# Patient Record
Sex: FEMALE | Race: WHITE | Employment: OTHER | URBAN - METROPOLITAN AREA
[De-identification: names, ages, dates, MRNs, and addresses within clinical notes are randomized per-mention and may not be internally consistent; named-entity substitution may affect disease eponyms.]

---

## 2017-03-30 RX ORDER — AMLODIPINE BESYLATE 10 MG/1
10 TABLET ORAL DAILY
Qty: 90 TABLET | Refills: 0 | Status: SHIPPED | OUTPATIENT
Start: 2017-03-30 | End: 2017-04-10 | Stop reason: SDUPTHER

## 2017-03-30 RX ORDER — LISINOPRIL 20 MG/1
20 TABLET ORAL 2 TIMES DAILY
Qty: 180 TABLET | Refills: 0 | Status: SHIPPED | OUTPATIENT
Start: 2017-03-30 | End: 2017-04-10 | Stop reason: SDUPTHER

## 2017-03-30 RX ORDER — HYDROCHLOROTHIAZIDE 12.5 MG/1
12.5 CAPSULE, GELATIN COATED ORAL EVERY MORNING
Qty: 90 CAPSULE | Refills: 0 | Status: SHIPPED | OUTPATIENT
Start: 2017-03-30 | End: 2017-04-10 | Stop reason: SDUPTHER

## 2017-03-31 RX ORDER — HYDROCHLOROTHIAZIDE 12.5 MG/1
12.5 CAPSULE, GELATIN COATED ORAL EVERY MORNING
Qty: 90 CAPSULE | Refills: 0 | OUTPATIENT
Start: 2017-03-31

## 2017-03-31 RX ORDER — LISINOPRIL 20 MG/1
20 TABLET ORAL 2 TIMES DAILY
Qty: 180 TABLET | Refills: 0 | OUTPATIENT
Start: 2017-03-31

## 2017-03-31 RX ORDER — AMLODIPINE BESYLATE 10 MG/1
10 TABLET ORAL DAILY
Qty: 90 TABLET | Refills: 0 | OUTPATIENT
Start: 2017-03-31

## 2017-04-10 ENCOUNTER — OFFICE VISIT (OUTPATIENT)
Dept: FAMILY MEDICINE CLINIC | Age: 82
End: 2017-04-10

## 2017-04-10 VITALS
DIASTOLIC BLOOD PRESSURE: 74 MMHG | OXYGEN SATURATION: 96 % | HEART RATE: 62 BPM | SYSTOLIC BLOOD PRESSURE: 124 MMHG | BODY MASS INDEX: 26.52 KG/M2 | WEIGHT: 145 LBS

## 2017-04-10 DIAGNOSIS — H61.20 CERUMEN DEBRIS ON TYMPANIC MEMBRANE, UNSPECIFIED LATERALITY: ICD-10-CM

## 2017-04-10 DIAGNOSIS — I10 ESSENTIAL HYPERTENSION: ICD-10-CM

## 2017-04-10 DIAGNOSIS — M25.519 SHOULDER PAIN, UNSPECIFIED CHRONICITY, UNSPECIFIED LATERALITY: ICD-10-CM

## 2017-04-10 DIAGNOSIS — N95.9 MENOPAUSAL AND PERIMENOPAUSAL DISORDER: ICD-10-CM

## 2017-04-10 DIAGNOSIS — Z23 ENCOUNTER FOR IMMUNIZATION: ICD-10-CM

## 2017-04-10 DIAGNOSIS — Z00.00 ROUTINE GENERAL MEDICAL EXAMINATION AT A HEALTH CARE FACILITY: ICD-10-CM

## 2017-04-10 DIAGNOSIS — Z00.00 HEALTHCARE MAINTENANCE: Primary | ICD-10-CM

## 2017-04-10 DIAGNOSIS — F32.A DEPRESSION, UNSPECIFIED DEPRESSION TYPE: ICD-10-CM

## 2017-04-10 PROCEDURE — G0439 PPPS, SUBSEQ VISIT: HCPCS | Performed by: FAMILY MEDICINE

## 2017-04-10 PROCEDURE — G0009 ADMIN PNEUMOCOCCAL VACCINE: HCPCS | Performed by: FAMILY MEDICINE

## 2017-04-10 PROCEDURE — 90732 PPSV23 VACC 2 YRS+ SUBQ/IM: CPT | Performed by: FAMILY MEDICINE

## 2017-04-10 RX ORDER — CITALOPRAM 20 MG/1
20 TABLET ORAL DAILY
Qty: 90 TABLET | Refills: 3 | Status: SHIPPED | OUTPATIENT
Start: 2017-04-10 | End: 2018-06-04 | Stop reason: SDUPTHER

## 2017-04-10 RX ORDER — AMLODIPINE BESYLATE 10 MG/1
10 TABLET ORAL DAILY
Qty: 90 TABLET | Refills: 3 | Status: SHIPPED | OUTPATIENT
Start: 2017-04-10 | End: 2018-06-04 | Stop reason: SDUPTHER

## 2017-04-10 RX ORDER — LISINOPRIL 20 MG/1
20 TABLET ORAL 2 TIMES DAILY
Qty: 180 TABLET | Refills: 3 | Status: SHIPPED | OUTPATIENT
Start: 2017-04-10 | End: 2018-06-04 | Stop reason: SDUPTHER

## 2017-04-10 RX ORDER — HYDROCHLOROTHIAZIDE 12.5 MG/1
12.5 CAPSULE, GELATIN COATED ORAL EVERY MORNING
Qty: 90 CAPSULE | Refills: 3 | Status: SHIPPED | OUTPATIENT
Start: 2017-04-10 | End: 2018-06-04 | Stop reason: SDUPTHER

## 2017-04-10 ASSESSMENT — LIFESTYLE VARIABLES: HOW OFTEN DO YOU HAVE A DRINK CONTAINING ALCOHOL: 0

## 2017-04-10 ASSESSMENT — PATIENT HEALTH QUESTIONNAIRE - PHQ9: SUM OF ALL RESPONSES TO PHQ QUESTIONS 1-9: 0

## 2017-04-10 ASSESSMENT — ANXIETY QUESTIONNAIRES: GAD7 TOTAL SCORE: 0

## 2017-04-11 DIAGNOSIS — E87.5 HYPERKALEMIA: Primary | ICD-10-CM

## 2017-04-11 LAB
ANION GAP SERPL CALCULATED.3IONS-SCNC: 13 MMOL/L (ref 3–16)
BUN BLDV-MCNC: 21 MG/DL (ref 7–20)
CALCIUM SERPL-MCNC: 10.2 MG/DL (ref 8.3–10.6)
CHLORIDE BLD-SCNC: 103 MMOL/L (ref 99–110)
CO2: 27 MMOL/L (ref 21–32)
CREAT SERPL-MCNC: 0.8 MG/DL (ref 0.6–1.2)
GFR AFRICAN AMERICAN: >60
GFR NON-AFRICAN AMERICAN: >60
GLUCOSE BLD-MCNC: 101 MG/DL (ref 70–99)
POTASSIUM SERPL-SCNC: 5.5 MMOL/L (ref 3.5–5.1)
SODIUM BLD-SCNC: 143 MMOL/L (ref 136–145)

## 2018-06-04 ENCOUNTER — OFFICE VISIT (OUTPATIENT)
Dept: FAMILY MEDICINE CLINIC | Age: 83
End: 2018-06-04

## 2018-06-04 VITALS — WEIGHT: 150.4 LBS | BODY MASS INDEX: 27.68 KG/M2 | HEIGHT: 62 IN

## 2018-06-04 DIAGNOSIS — I10 ESSENTIAL HYPERTENSION: ICD-10-CM

## 2018-06-04 DIAGNOSIS — F32.A DEPRESSION, UNSPECIFIED DEPRESSION TYPE: ICD-10-CM

## 2018-06-04 DIAGNOSIS — E55.9 VITAMIN D DEFICIENCY: ICD-10-CM

## 2018-06-04 DIAGNOSIS — Z00.00 ROUTINE GENERAL MEDICAL EXAMINATION AT A HEALTH CARE FACILITY: Primary | ICD-10-CM

## 2018-06-04 PROCEDURE — G0439 PPPS, SUBSEQ VISIT: HCPCS | Performed by: NURSE PRACTITIONER

## 2018-06-04 RX ORDER — LISINOPRIL 20 MG/1
20 TABLET ORAL 2 TIMES DAILY
Qty: 180 TABLET | Refills: 3 | Status: SHIPPED | OUTPATIENT
Start: 2018-06-04 | End: 2019-07-01 | Stop reason: SDUPTHER

## 2018-06-04 RX ORDER — HYDROCHLOROTHIAZIDE 12.5 MG/1
12.5 CAPSULE, GELATIN COATED ORAL EVERY MORNING
Qty: 90 CAPSULE | Refills: 3 | Status: SHIPPED | OUTPATIENT
Start: 2018-06-04 | End: 2019-07-15 | Stop reason: SDUPTHER

## 2018-06-04 RX ORDER — AMLODIPINE BESYLATE 10 MG/1
10 TABLET ORAL DAILY
Qty: 90 TABLET | Refills: 3 | Status: SHIPPED | OUTPATIENT
Start: 2018-06-04 | End: 2019-07-15 | Stop reason: SDUPTHER

## 2018-06-04 RX ORDER — CITALOPRAM 20 MG/1
20 TABLET ORAL DAILY
Qty: 90 TABLET | Refills: 3 | Status: SHIPPED | OUTPATIENT
Start: 2018-06-04 | End: 2019-05-27 | Stop reason: SDUPTHER

## 2018-06-04 ASSESSMENT — LIFESTYLE VARIABLES
HOW OFTEN DURING THE LAST YEAR HAVE YOU BEEN UNABLE TO REMEMBER WHAT HAPPENED THE NIGHT BEFORE BECAUSE YOU HAD BEEN DRINKING: 0
HOW OFTEN DO YOU HAVE A DRINK CONTAINING ALCOHOL: 0
HOW OFTEN DO YOU HAVE SIX OR MORE DRINKS ON ONE OCCASION: 0
HOW OFTEN DURING THE LAST YEAR HAVE YOU HAD A FEELING OF GUILT OR REMORSE AFTER DRINKING: 0
HAVE YOU OR SOMEONE ELSE BEEN INJURED AS A RESULT OF YOUR DRINKING: 0
HOW OFTEN DURING THE LAST YEAR HAVE YOU NEEDED AN ALCOHOLIC DRINK FIRST THING IN THE MORNING TO GET YOURSELF GOING AFTER A NIGHT OF HEAVY DRINKING: 0
HAS A RELATIVE, FRIEND, DOCTOR, OR ANOTHER HEALTH PROFESSIONAL EXPRESSED CONCERN ABOUT YOUR DRINKING OR SUGGESTED YOU CUT DOWN: 0
HOW OFTEN DURING THE LAST YEAR HAVE YOU FOUND THAT YOU WERE NOT ABLE TO STOP DRINKING ONCE YOU HAD STARTED: 0
HOW OFTEN DURING THE LAST YEAR HAVE YOU FAILED TO DO WHAT WAS NORMALLY EXPECTED FROM YOU BECAUSE OF DRINKING: 0

## 2018-06-04 ASSESSMENT — PATIENT HEALTH QUESTIONNAIRE - PHQ9: SUM OF ALL RESPONSES TO PHQ QUESTIONS 1-9: 0

## 2018-06-04 ASSESSMENT — ANXIETY QUESTIONNAIRES: GAD7 TOTAL SCORE: 0

## 2018-06-05 DIAGNOSIS — E55.9 VITAMIN D DEFICIENCY: ICD-10-CM

## 2018-06-05 DIAGNOSIS — I10 ESSENTIAL HYPERTENSION: ICD-10-CM

## 2018-06-06 LAB
A/G RATIO: 1.9 (ref 1.1–2.2)
ALBUMIN SERPL-MCNC: 4.1 G/DL (ref 3.4–5)
ALP BLD-CCNC: 47 U/L (ref 40–129)
ALT SERPL-CCNC: 9 U/L (ref 10–40)
ANION GAP SERPL CALCULATED.3IONS-SCNC: 11 MMOL/L (ref 3–16)
AST SERPL-CCNC: 14 U/L (ref 15–37)
BILIRUB SERPL-MCNC: 0.4 MG/DL (ref 0–1)
BUN BLDV-MCNC: 17 MG/DL (ref 7–20)
CALCIUM SERPL-MCNC: 10.1 MG/DL (ref 8.3–10.6)
CHLORIDE BLD-SCNC: 101 MMOL/L (ref 99–110)
CHOLESTEROL, TOTAL: 183 MG/DL (ref 0–199)
CO2: 29 MMOL/L (ref 21–32)
CREAT SERPL-MCNC: 0.8 MG/DL (ref 0.6–1.2)
GFR AFRICAN AMERICAN: >60
GFR NON-AFRICAN AMERICAN: >60
GLOBULIN: 2.2 G/DL
GLUCOSE BLD-MCNC: 93 MG/DL (ref 70–99)
HDLC SERPL-MCNC: 48 MG/DL (ref 40–60)
LDL CHOLESTEROL CALCULATED: 118 MG/DL
POTASSIUM SERPL-SCNC: 4.7 MMOL/L (ref 3.5–5.1)
SODIUM BLD-SCNC: 141 MMOL/L (ref 136–145)
TOTAL PROTEIN: 6.3 G/DL (ref 6.4–8.2)
TRIGL SERPL-MCNC: 85 MG/DL (ref 0–150)
VITAMIN D 25-HYDROXY: 62.6 NG/ML
VLDLC SERPL CALC-MCNC: 17 MG/DL

## 2019-02-19 ENCOUNTER — OFFICE VISIT (OUTPATIENT)
Dept: FAMILY MEDICINE CLINIC | Age: 84
End: 2019-02-19
Payer: MEDICARE

## 2019-02-19 ENCOUNTER — TELEPHONE (OUTPATIENT)
Dept: FAMILY MEDICINE CLINIC | Age: 84
End: 2019-02-19

## 2019-02-19 VITALS
TEMPERATURE: 98.1 F | SYSTOLIC BLOOD PRESSURE: 124 MMHG | OXYGEN SATURATION: 98 % | HEIGHT: 63 IN | HEART RATE: 62 BPM | DIASTOLIC BLOOD PRESSURE: 82 MMHG | WEIGHT: 151 LBS | BODY MASS INDEX: 26.75 KG/M2

## 2019-02-19 DIAGNOSIS — J04.0 LARYNGITIS: Primary | ICD-10-CM

## 2019-02-19 DIAGNOSIS — J40 BRONCHITIS: ICD-10-CM

## 2019-02-19 PROCEDURE — 99213 OFFICE O/P EST LOW 20 MIN: CPT | Performed by: FAMILY MEDICINE

## 2019-02-19 RX ORDER — FLUTICASONE PROPIONATE 50 MCG
2 SPRAY, SUSPENSION (ML) NASAL DAILY
Qty: 3 BOTTLE | Refills: 1 | Status: SHIPPED | OUTPATIENT
Start: 2019-02-19 | End: 2021-06-25

## 2019-02-19 RX ORDER — BENZONATATE 100 MG/1
100 CAPSULE ORAL 2 TIMES DAILY PRN
Qty: 20 CAPSULE | Refills: 0 | Status: SHIPPED | OUTPATIENT
Start: 2019-02-19 | End: 2019-02-26

## 2019-03-07 ENCOUNTER — TELEPHONE (OUTPATIENT)
Dept: FAMILY MEDICINE CLINIC | Age: 84
End: 2019-03-07

## 2019-05-27 DIAGNOSIS — F32.A DEPRESSION, UNSPECIFIED DEPRESSION TYPE: ICD-10-CM

## 2019-05-28 RX ORDER — CITALOPRAM 20 MG/1
TABLET ORAL
Qty: 90 TABLET | Refills: 2 | Status: SHIPPED | OUTPATIENT
Start: 2019-05-28 | End: 2019-08-06 | Stop reason: SDUPTHER

## 2019-05-28 NOTE — TELEPHONE ENCOUNTER
Last OV 2/19/2019   Return in about 4 months (around 6/19/2019) for AWV  Next OV Visit date not found  Last Armin  6/4/2018

## 2019-07-01 DIAGNOSIS — I10 ESSENTIAL HYPERTENSION: ICD-10-CM

## 2019-07-01 RX ORDER — LISINOPRIL 20 MG/1
TABLET ORAL
Qty: 180 TABLET | Refills: 0 | Status: SHIPPED | OUTPATIENT
Start: 2019-07-01 | End: 2019-08-06 | Stop reason: SDUPTHER

## 2019-07-01 NOTE — TELEPHONE ENCOUNTER
Medication:   Requested Prescriptions     Pending Prescriptions Disp Refills    lisinopril (PRINIVIL;ZESTRIL) 20 MG tablet [Pharmacy Med Name: LISINOPRIL 20 MG TABLET] 180 tablet 2     Sig: TAKE ONE TABLET BY MOUTH TWICE A DAY        Last Filled:  [phone] #180 w/ 3 refills     Patient Phone Number: [phone] (home)      Last appt: 2/19/2019 Return in about 4 months (around 6/19/2019) for AWV. Next appt: Visit date not found    Last OARRS: No flowsheet data found.

## 2019-07-15 DIAGNOSIS — I10 ESSENTIAL HYPERTENSION: ICD-10-CM

## 2019-07-15 RX ORDER — HYDROCHLOROTHIAZIDE 12.5 MG/1
12.5 CAPSULE, GELATIN COATED ORAL EVERY MORNING
Qty: 90 CAPSULE | Refills: 3 | Status: SHIPPED | OUTPATIENT
Start: 2019-07-15 | End: 2019-08-06 | Stop reason: SDUPTHER

## 2019-07-15 RX ORDER — AMLODIPINE BESYLATE 10 MG/1
10 TABLET ORAL DAILY
Qty: 90 TABLET | Refills: 3 | Status: SHIPPED | OUTPATIENT
Start: 2019-07-15 | End: 2019-08-06 | Stop reason: SDUPTHER

## 2019-07-15 NOTE — TELEPHONE ENCOUNTER
Medication:   Requested Prescriptions     Pending Prescriptions Disp Refills    amLODIPine (NORVASC) 10 MG tablet 90 tablet 3     Sig: Take 1 tablet by mouth daily    hydrochlorothiazide (MICROZIDE) 12.5 MG capsule 90 capsule 3     Sig: Take 1 capsule by mouth every morning        Last Filled:    Amlodipine 06.04.2018 #90 w/ 3 refills   HCTZ 06.04.2018 #90 w/ 3 refills     Patient Phone Number: [phone] (home)     Last appt: 2/19/2019 Return in about 4 months (around 6/19/2019) for AWV  Next appt: 8/6/2019    Last OARRS: No flowsheet data found.

## 2019-08-06 ENCOUNTER — OFFICE VISIT (OUTPATIENT)
Dept: FAMILY MEDICINE CLINIC | Age: 84
End: 2019-08-06
Payer: MEDICARE

## 2019-08-06 VITALS
RESPIRATION RATE: 12 BRPM | OXYGEN SATURATION: 94 % | DIASTOLIC BLOOD PRESSURE: 72 MMHG | HEART RATE: 79 BPM | HEIGHT: 62 IN | BODY MASS INDEX: 27.05 KG/M2 | WEIGHT: 147 LBS | SYSTOLIC BLOOD PRESSURE: 136 MMHG

## 2019-08-06 DIAGNOSIS — F32.A DEPRESSION, UNSPECIFIED DEPRESSION TYPE: ICD-10-CM

## 2019-08-06 DIAGNOSIS — Z78.0 POST-MENOPAUSAL: ICD-10-CM

## 2019-08-06 DIAGNOSIS — Z00.00 ROUTINE GENERAL MEDICAL EXAMINATION AT A HEALTH CARE FACILITY: Primary | ICD-10-CM

## 2019-08-06 DIAGNOSIS — I10 ESSENTIAL HYPERTENSION: ICD-10-CM

## 2019-08-06 DIAGNOSIS — Z23 NEED FOR PROPHYLACTIC VACCINATION AND INOCULATION AGAINST VARICELLA: ICD-10-CM

## 2019-08-06 DIAGNOSIS — Z23 NEED FOR PROPHYLACTIC VACCINATION AGAINST DIPHTHERIA-TETANUS-PERTUSSIS (DTP): ICD-10-CM

## 2019-08-06 PROCEDURE — G0439 PPPS, SUBSEQ VISIT: HCPCS | Performed by: FAMILY MEDICINE

## 2019-08-06 RX ORDER — HYDROCHLOROTHIAZIDE 12.5 MG/1
12.5 CAPSULE, GELATIN COATED ORAL EVERY MORNING
Qty: 90 CAPSULE | Refills: 3 | Status: SHIPPED | OUTPATIENT
Start: 2019-08-06 | End: 2020-07-28 | Stop reason: SDUPTHER

## 2019-08-06 RX ORDER — LISINOPRIL 20 MG/1
TABLET ORAL
Qty: 180 TABLET | Refills: 3 | Status: SHIPPED | OUTPATIENT
Start: 2019-08-06 | End: 2020-07-06

## 2019-08-06 RX ORDER — OMEPRAZOLE 20 MG/1
20 CAPSULE, DELAYED RELEASE ORAL
Qty: 90 CAPSULE | Refills: 3 | Status: SHIPPED | OUTPATIENT
Start: 2019-08-06 | End: 2020-10-01

## 2019-08-06 RX ORDER — CITALOPRAM 20 MG/1
TABLET ORAL
Qty: 90 TABLET | Refills: 3 | Status: SHIPPED | OUTPATIENT
Start: 2019-08-06 | End: 2020-08-28

## 2019-08-06 RX ORDER — AMLODIPINE BESYLATE 10 MG/1
10 TABLET ORAL DAILY
Qty: 90 TABLET | Refills: 3 | Status: SHIPPED | OUTPATIENT
Start: 2019-08-06 | End: 2020-08-03 | Stop reason: SDUPTHER

## 2019-08-06 ASSESSMENT — PATIENT HEALTH QUESTIONNAIRE - PHQ9
SUM OF ALL RESPONSES TO PHQ QUESTIONS 1-9: 0
SUM OF ALL RESPONSES TO PHQ QUESTIONS 1-9: 0

## 2019-08-06 ASSESSMENT — LIFESTYLE VARIABLES: HOW OFTEN DO YOU HAVE A DRINK CONTAINING ALCOHOL: 0

## 2019-08-06 NOTE — PROGRESS NOTES
Topic Date Due    DTaP/Tdap/Td vaccine (1 - Tdap) 1953    Shingles Vaccine (1 of 2) 1984    Annual Wellness Visit (AWV)  2019    Potassium monitoring  2019    Creatinine monitoring  2019    Flu vaccine (1) 2019    Pneumococcal 65+ years Vaccine  Completed    DEXA (modify frequency per FRAX score)  Addressed     Recommendations for Preventive Services Due: see orders and patient instructions/AVS.  . Recommended screening schedule for the next 5-10 years is provided to the patient in written form: see Patient Instructions/AVS.            HTN:Tolerating medications well and taking them as directed. Bp at home is about 128/70. The highest at home is about 138. She denies any low blood pressures. No symptoms concerning for end organ damage are present. BMP today.      Major depression: The patient is stable on her Celexa. Glaucoma: The patient sees an eye doctor regularly. Osteoarthritis:  Taking aleve BID. Discussed risks of ulcer and GI bleed. Patient demonstrates understanding and and adamant that she needs to take this twice a day despite the risks. She reports Tylenol does not help. She does not care to try any other additional treatments. Given a prescription for Prilosec which she will consider taking. Discussed increased risk of bleed especially while she is taking Celexa. She still prefers to continue the Aleve and Celexa. SH: lives alone. Daughter is in [place]. Sees her a lot. Hx of living at [facility] 64 when second  was alive. Had 6 children. 1  of AIDS and another of cancer.

## 2019-08-13 ENCOUNTER — HOSPITAL ENCOUNTER (OUTPATIENT)
Dept: GENERAL RADIOLOGY | Age: 84
Discharge: HOME | End: 2019-08-13
Payer: MEDICARE

## 2019-08-13 DIAGNOSIS — Z78.0 POST-MENOPAUSAL: ICD-10-CM

## 2019-08-13 PROCEDURE — 77080 DXA BONE DENSITY AXIAL: CPT

## 2019-08-15 PROBLEM — M85.89 OSTEOPENIA OF MULTIPLE SITES: Status: ACTIVE | Noted: 2019-08-15

## 2019-08-19 RX ORDER — ALENDRONATE SODIUM 70 MG/1
70 TABLET ORAL
Qty: 4 TABLET | Refills: 5 | Status: SHIPPED | OUTPATIENT
Start: 2019-08-19 | End: 2020-07-28 | Stop reason: SDUPTHER

## 2020-07-06 RX ORDER — LISINOPRIL 20 MG/1
TABLET ORAL
Qty: 180 TABLET | Refills: 0 | Status: SHIPPED | OUTPATIENT
Start: 2020-07-06 | End: 2020-10-01 | Stop reason: SDUPTHER

## 2020-07-06 NOTE — TELEPHONE ENCOUNTER
Medication:   Requested Prescriptions     Pending Prescriptions Disp Refills    lisinopril (PRINIVIL;ZESTRIL) 20 MG tablet [Pharmacy Med Name: LISINOPRIL 20 MG TABLET] 180 tablet 2     Sig: TAKE ONE TABLET BY MOUTH TWICE A DAY        Last Filled:  8/6/2019 #180 w/3 refills     Patient Phone Number: [phone] (home)     Last appt: 8/6/2019     Return in about 1 year (around 8/6/2020) for Medicare Annual Wellness Visit in 1 year. Next appt: Visit date not found    Last OARRS: No flowsheet data found.

## 2020-07-28 RX ORDER — ALENDRONATE SODIUM 70 MG/1
70 TABLET ORAL
Qty: 4 TABLET | Refills: 5 | Status: SHIPPED | OUTPATIENT
Start: 2020-07-28 | End: 2021-04-22

## 2020-07-28 RX ORDER — HYDROCHLOROTHIAZIDE 12.5 MG/1
12.5 CAPSULE, GELATIN COATED ORAL EVERY MORNING
Qty: 90 CAPSULE | Refills: 0 | Status: SHIPPED | OUTPATIENT
Start: 2020-07-28 | End: 2020-10-01 | Stop reason: SDUPTHER

## 2020-07-28 NOTE — TELEPHONE ENCOUNTER
Medication:   Requested Prescriptions     Pending Prescriptions Disp Refills    alendronate (FOSAMAX) 70 MG tablet 4 tablet 5     Sig: Take 1 tablet by mouth every 7 days    hydroCHLOROthiazide (MICROZIDE) 12.5 MG capsule 90 capsule 3     Sig: Take 1 capsule by mouth every morning        Last Filled:  8/19/2019 & 8/6/2019 90 supply w/3/5 refills     Patient Phone Number: [phone] (home)     Last appt: 8/6/2019  Next appt: 9/8/2020    Last OARRS: No flowsheet data found.

## 2020-08-03 RX ORDER — AMLODIPINE BESYLATE 10 MG/1
10 TABLET ORAL DAILY
Qty: 90 TABLET | Refills: 0 | Status: SHIPPED | OUTPATIENT
Start: 2020-08-03 | End: 2020-10-01 | Stop reason: SDUPTHER

## 2020-08-03 NOTE — TELEPHONE ENCOUNTER
Medication:   Requested Prescriptions     Pending Prescriptions Disp Refills    amLODIPine (NORVASC) 10 MG tablet 90 tablet 0     Sig: Take 1 tablet by mouth daily        Last Filled:  8/6/2020 #90 w/3 refills     Patient Phone Number: [phone] (home)     Last appt: 8/6/2019    Return in about 1 year (around 8/6/2020) for Medicare Annual Wellness Visit in 1 year. Next appt: 9/8/2020  Sept is the soonest pt could get in    Last OARRS: No flowsheet data found.

## 2020-08-28 RX ORDER — CITALOPRAM 20 MG/1
TABLET ORAL
Qty: 90 TABLET | Refills: 2 | Status: SHIPPED | OUTPATIENT
Start: 2020-08-28 | End: 2020-10-01 | Stop reason: SDUPTHER

## 2020-09-01 RX ORDER — CITALOPRAM 20 MG/1
TABLET ORAL
Qty: 90 TABLET | Refills: 2 | OUTPATIENT
Start: 2020-09-01

## 2020-09-01 NOTE — TELEPHONE ENCOUNTER
Medication:   Requested Prescriptions     Pending Prescriptions Disp Refills    citalopram (CELEXA) 20 MG tablet [Pharmacy Med Name: CITALOPRAM HBR 20 MG TABLET] 90 tablet 2     Sig: TAKE ONE TABLET BY MOUTH DAILY        Last Filled:  DONE 8/28/2020 #90 w/ 2 refills, request denied. Patient Phone Number: [phone] (home)     Last appt: Visit date not found   Next appt: 9/11/2020    Last OARRS: No flowsheet data found.

## 2020-10-01 ENCOUNTER — HOSPITAL ENCOUNTER (OUTPATIENT)
Dept: GENERAL RADIOLOGY | Age: 85
Discharge: HOME | End: 2020-10-01
Payer: MEDICARE

## 2020-10-01 ENCOUNTER — OFFICE VISIT (OUTPATIENT)
Dept: FAMILY MEDICINE CLINIC | Age: 85
End: 2020-10-01
Payer: MEDICARE

## 2020-10-01 VITALS
SYSTOLIC BLOOD PRESSURE: 120 MMHG | HEART RATE: 63 BPM | WEIGHT: 137 LBS | OXYGEN SATURATION: 96 % | TEMPERATURE: 97.2 F | HEIGHT: 62 IN | BODY MASS INDEX: 25.21 KG/M2 | DIASTOLIC BLOOD PRESSURE: 70 MMHG

## 2020-10-01 DIAGNOSIS — I10 ESSENTIAL HYPERTENSION: ICD-10-CM

## 2020-10-01 LAB
ANION GAP SERPL CALCULATED.3IONS-SCNC: 12 MMOL/L (ref 3–16)
BUN BLDV-MCNC: 15 MG/DL (ref 7–20)
CALCIUM SERPL-MCNC: 10.3 MG/DL (ref 8.3–10.6)
CHLORIDE BLD-SCNC: 104 MMOL/L (ref 99–110)
CO2: 28 MMOL/L (ref 21–32)
CREAT SERPL-MCNC: 0.8 MG/DL (ref 0.6–1.2)
GFR AFRICAN AMERICAN: >60
GFR NON-AFRICAN AMERICAN: >60
GLUCOSE BLD-MCNC: 149 MG/DL (ref 70–99)
POTASSIUM SERPL-SCNC: 3.9 MMOL/L (ref 3.5–5.1)
SODIUM BLD-SCNC: 144 MMOL/L (ref 136–145)

## 2020-10-01 PROCEDURE — 90694 VACC AIIV4 NO PRSRV 0.5ML IM: CPT | Performed by: FAMILY MEDICINE

## 2020-10-01 PROCEDURE — G0439 PPPS, SUBSEQ VISIT: HCPCS | Performed by: FAMILY MEDICINE

## 2020-10-01 PROCEDURE — G0008 ADMIN INFLUENZA VIRUS VAC: HCPCS | Performed by: FAMILY MEDICINE

## 2020-10-01 PROCEDURE — 73562 X-RAY EXAM OF KNEE 3: CPT

## 2020-10-01 RX ORDER — AMLODIPINE BESYLATE 10 MG/1
10 TABLET ORAL DAILY
Qty: 90 TABLET | Refills: 3 | Status: SHIPPED | OUTPATIENT
Start: 2020-10-01 | End: 2021-10-11

## 2020-10-01 RX ORDER — LISINOPRIL 20 MG/1
TABLET ORAL
Qty: 90 TABLET | Refills: 3
Start: 2020-10-01 | End: 2020-11-27

## 2020-10-01 RX ORDER — HYDROCHLOROTHIAZIDE 12.5 MG/1
12.5 CAPSULE, GELATIN COATED ORAL EVERY MORNING
Qty: 90 CAPSULE | Refills: 3 | Status: SHIPPED | OUTPATIENT
Start: 2020-10-01 | End: 2021-10-20

## 2020-10-01 RX ORDER — CITALOPRAM 20 MG/1
TABLET ORAL
Qty: 90 TABLET | Refills: 3 | Status: SHIPPED | OUTPATIENT
Start: 2020-10-01 | End: 2021-05-17 | Stop reason: SDUPTHER

## 2020-10-01 ASSESSMENT — PATIENT HEALTH QUESTIONNAIRE - PHQ9
SUM OF ALL RESPONSES TO PHQ9 QUESTIONS 1 & 2: 0
SUM OF ALL RESPONSES TO PHQ QUESTIONS 1-9: 0
1. LITTLE INTEREST OR PLEASURE IN DOING THINGS: 0
2. FEELING DOWN, DEPRESSED OR HOPELESS: 0
SUM OF ALL RESPONSES TO PHQ QUESTIONS 1-9: 0

## 2020-10-01 ASSESSMENT — LIFESTYLE VARIABLES: HOW OFTEN DO YOU HAVE A DRINK CONTAINING ALCOHOL: 0

## 2020-10-01 NOTE — PATIENT INSTRUCTIONS
may be suggested for a condition or for rehabilitation. Start each exercise slowly. Ease off the exercises if you start to have pain. You will be told when to start these exercises and which ones will work best for you. How to do the exercises  Quad sets   Sit with your leg straight and supported on the floor or a firm bed. (If you feel discomfort in the front or back of your knee, place a small towel roll under your knee.)  Tighten the muscles on top of your thigh by pressing the back of your knee flat down to the floor. (If you feel discomfort under your kneecap, place a small towel roll under your knee.)  Hold for about 6 seconds, then rest for up to 10 seconds. Do 8 to 12 repetitions several times a day. Straight-leg raises to the front   Lie on your back with your good knee bent so that your foot rests flat on the floor. Your injured leg should be straight. Make sure that your low back has a normal curve. You should be able to slip your flat hand in between the floor and the small of your back, with your palm touching the floor and your back touching the back of your hand. Tighten the thigh muscles in the injured leg by pressing the back of your knee flat down to the floor. Hold your knee straight. Keeping the thigh muscles tight, lift your injured leg up so that your heel is about 12 inches off the floor. Hold for about 6 seconds and then lower slowly. Do 8 to 12 repetitions, 3 times a day. Straight-leg raises to the outside   Lie on your side, with your injured leg on top. Tighten the front thigh muscles of your injured leg to keep your knee straight. Keep your hip and your leg straight in line with the rest of your body, and keep your knee pointing forward. Do not drop your hip back. Lift your injured leg straight up toward the ceiling, about 12 inches off the floor. Hold for about 6 seconds, then slowly lower your leg. Do 8 to 12 repetitions.     Straight-leg raises to the back   Lie on your if you have very little pain; if you have no clicking, locking, or giving way if you have an injured knee; and if it does not hurt while you are doing 8 to 12 repetitions. Stand with your hands lightly resting on a counter or chair in front of you. Put your feet shoulder-width apart. Slowly bend your knees so that you squat down like you are going to sit in a chair. Make sure your knees do not go in front of your toes. Lower yourself about 6 inches. Your heels should remain on the floor at all times. Rise slowly to a standing position. Heel raises   Stand with your feet a few inches apart, with your hands lightly resting on a counter or chair in front of you. Slowly raise your heels off the floor while keeping your knees straight. Hold for about 6 seconds, then slowly lower your heels to the floor. Do 8 to 12 repetitions several times during the day. Follow-up care is a key part of your treatment and safety. Be sure to make and go to all appointments, and call your doctor if you are having problems. It's also a good idea to know your test results and keep a list of the medicines you take. Where can you learn more? Go to [email or web address]://[email or web address] org and sign in to your [employer] account. Enter O931 in the "[employer] box to learn more about \"Knee: Exercises. \"     If you do not have an account, please click on the \"Sign Up Now\" link. Current as of: March 2, 2020               Content Version: 12.5  © 2006-2020 Healthwise, Incorporated. Care instructions adapted under license by [facility] ([facility]). If you have questions about a medical condition or this instruction, always ask your healthcare professional. [place] [address] any warranty or liability for your use of this information.

## 2020-10-01 NOTE — PROGRESS NOTES
Vaccine Information Sheet, \"Influenza - Inactivated\"  given to Lakesha Gil, or parent/legal guardian of  Lakesha Gil and verbalized understanding. Patient responses:    Have you ever had a reaction to a flu vaccine? No  Do you have any current illness? No  Have you ever had [place]illian [place] Syndrome? No  Do you have a serious allergy to any of the follow: Neomycin, Polymyxin, Thimerosal, eggs or egg products? No    Flu vaccine given per order. Please see immunization tab. Risks and benefits explained. Current VIS given.       Immunizations Administered     Name Date Dose Route    Influenza, Quadv, adjuvanted, 65 yrs +, IM, PF (Fluad) 10/1/2020 0.5 mL Intramuscular    Site: Deltoid- Left    Lot: [id number]    NDC: 70361-249-20

## 2020-10-02 ENCOUNTER — TELEPHONE (OUTPATIENT)
Dept: FAMILY MEDICINE CLINIC | Age: 85
End: 2020-10-02

## 2020-10-02 NOTE — TELEPHONE ENCOUNTER
Requesting ortho referral for her knee  Pended referral based on location of home address for patient

## 2020-10-05 NOTE — TELEPHONE ENCOUNTER
Patient called back to zonia Han and Dr. Lizandro Calhoun know she needs a referral to Dr. Vernelle Ahumada at [facility] ([phone]) - this provider is covered by her insurance

## 2020-11-27 RX ORDER — LISINOPRIL 20 MG/1
TABLET ORAL
Qty: 180 TABLET | Refills: 0 | Status: SHIPPED | OUTPATIENT
Start: 2020-11-27 | End: 2021-04-27

## 2020-11-27 NOTE — TELEPHONE ENCOUNTER
Medication:   Requested Prescriptions     Pending Prescriptions Disp Refills    lisinopril (PRINIVIL;ZESTRIL) 20 MG tablet [Pharmacy Med Name: LISINOPRIL 20 MG TABLET] 180 tablet 0     Sig: TAKE ONE TABLET BY MOUTH TWICE A DAY        Last Filled:    10/1/2020 90 tabs 3 refills   Patient Phone Number: [phone] (home)     Last appt: 10/1/2020   Next appt: Visit date not found    Last OARRS: No flowsheet data found. Pt needs appointment to establish care with a new provider.

## 2021-03-11 ENCOUNTER — TELEPHONE (OUTPATIENT)
Dept: FAMILY MEDICINE CLINIC | Age: 86
End: 2021-03-11

## 2021-03-11 DIAGNOSIS — M25.559 HIP PAIN: ICD-10-CM

## 2021-03-11 DIAGNOSIS — M25.562 CHRONIC PAIN OF BOTH KNEES: Primary | ICD-10-CM

## 2021-03-11 DIAGNOSIS — G89.29 CHRONIC PAIN OF BOTH KNEES: Primary | ICD-10-CM

## 2021-03-11 DIAGNOSIS — M25.561 CHRONIC PAIN OF BOTH KNEES: Primary | ICD-10-CM

## 2021-03-11 NOTE — TELEPHONE ENCOUNTER
Patient is having problems moving around. (Knees and hips)      Patient wants to know if a Cortizone shot would help?  Please advise, thanks

## 2021-03-12 NOTE — TELEPHONE ENCOUNTER
Please confirm- does she want a PT or an ortho referral? Does she want to do therapy or see a specialist for orthopaedic issues (they would do an injection if appropriate)?  I am comfortable referring her to either although if she would like a recommendation I would start with PT.

## 2021-03-22 ENCOUNTER — HOSPITAL ENCOUNTER (OUTPATIENT)
Dept: PHYSICAL THERAPY | Age: 86
Setting detail: THERAPIES SERIES
Discharge: HOME | End: 2021-03-22
Payer: MEDICARE

## 2021-03-22 PROCEDURE — 97161 PT EVAL LOW COMPLEX 20 MIN: CPT

## 2021-03-22 PROCEDURE — 97110 THERAPEUTIC EXERCISES: CPT

## 2021-03-22 NOTE — FLOWSHEET NOTE
[address], [address] [place] [address]  Phone: [phone]   Fax:     [phone]    Physical Therapy Daily Treatment Note  Date:  3/22/2021    Patient Name:  Eros Schumacher    :  1934  MRN: [id number]  Restrictions/Precautions:    Medical/Treatment Diagnosis Information:  · Diagnosis: M25.561, M25.562, G89.29 (ICD-10-CM) - Chronic pain of both knees  · Treatment Diagnosis: M25.562 Left knee pain, M2.662 Left knee stiffness  Insurance/Certification information:  PT Insurance Information: A[employer]na Medicare  Physician Information:  Referring Practitioner: Gagan Henriquez MD  Has the plan of care been signed (Y/N):        []  Yes  [x]  No     Date of Patient follow up with Physician: PRN      Is this a Progress Report:     []  Yes  [x]  No        If Yes:  Date Range for reporting period:  Beginning 3/22/21  Ending    Progress report will be due (10 Rx or 30 days whichever is less):  98      Recertification will be due (POC Duration  / 90 days whichever is less): 21         Visit # Insurance Allowable Auth Required   1 A[employer]na Medicare []  Yes [x]  No        Functional Scale: WOMAC 51% deficit Date assessed:  3/22/21       Latex Allergy:  [x]NO      []YES  Preferred Language for Healthcare:   [x]English       []other:    Pain level:  0-9/10     SUBJECTIVE:  See eval    OBJECTIVE: See eval   Observation:    Test measurements:      RESTRICTIONS/PRECAUTIONS: Osteopenia, HTN    Exercises/Interventions:   Exercise/Equipment Resistance/Repetitions Other comments   Stretching/AROM       Calf stretch 30\"hx1 L 3/22 long sitting with strap   Hamstring 30\"hx1 L 3/22 seated EOT   LAQ 1x10 L 3/22 seated EOT               Strengthening      SLR                               Neuromuscular re-ed      Quad set 10\"hx10 L 3/22 towel roll under ankle   TKE 1x10 L Blue TB 3/22    SLB                  Quantum machines       Leg press        Leg extension       Leg curl               Manual interventions                                   Therapeutic Exercise and NMR EXR  [x] (85595) Provided verbal/tactile cueing for activities related to strengthening, flexibility, endurance, ROM for improvements in LE, proximal hip, and core control with self care, mobility, lifting, ambulation.  [] (65042) Provided verbal/tactile cueing for activities related to improving balance, coordination, kinesthetic sense, posture, motor skill, proprioception  to assist with LE, proximal hip, and core control in self care, mobility, lifting, ambulation and eccentric single leg control.      NMR and Therapeutic Activities:    [] (84584 or 62849) Provided verbal/tactile cueing for activities related to improving balance, coordination, kinesthetic sense, posture, motor skill, proprioception and motor activation to allow for proper function of core, proximal hip and LE with self care and ADLs  [] (25790) Gait Re-education- Provided training and instruction to the patient for proper LE, core and proximal hip recruitment and positioning and eccentric body weight control with ambulation re-education including up and down stairs     Home Exercise Program:    [x] (95971) Reviewed/Progressed HEP activities related to strengthening, flexibility, endurance, ROM of core, proximal hip and LE for functional self-care, mobility, lifting and ambulation/stair navigation   [] (87031)Reviewed/Progressed HEP activities related to improving balance, coordination, kinesthetic sense, posture, motor skill, proprioception of core, proximal hip and LE for self care, mobility, lifting, and ambulation/stair navigation      Manual Treatments:  PROM / STM / Oscillations-Mobs:  G-I, II, III, IV (PA's, Inf., Post.)  [] (46685) Provided manual therapy to mobilize LE, proximal hip and/or LS spine soft tissue/joints for the purpose of modulating pain, promoting relaxation,  increasing ROM, reducing/eliminating soft tissue swelling/inflammation/restriction, improving soft tissue extensibility and allowing for proper ROM for normal function with self care, mobility, lifting and ambulation. Modalities:      Charges:  Timed Code Treatment Minutes: 15'+eval   Total Treatment Minutes: 1:45-2:35  50'       [x] EVAL (LOW) 95207 (typically 20 minutes face-to-face)  [] EVAL (MOD) 25541 (typically 30 minutes face-to-face)  [] EVAL (HIGH) 75380 (typically 45 minutes face-to-face)  [] RE-EVAL   [x] GT(74432) x  1   [] IONTO  [] NMR (53049) x     [] VASO  [] Manual (74135) x      [] Other:  [] TA x      [] Mech Traction (51400)  [] ES(attended) (71856)      [] ES (un) (57586):     GOALS:   Patient stated goal: Return to walking pain free     []? Progressing: []? Met: []? Not Met: []? Adjusted     Therapist goals for Patient:   Short Term Goals: To be achieved in: 4 weeks  1. Independent in HEP and progression per patient tolerance, in order to prevent re-injury. []? Progressing: []? Met: []? Not Met: []? Adjusted   2. Patient will have a decrease in pain to facilitate improvement in movement, function, and ADLs as indicated by Functional Deficits. []? Progressing: []? Met: []? Not Met: []? Adjusted     Long Term Goals: To be achieved in: 8 weeks  1. Disability index score of 10% or less for the [facility] to assist with reaching prior level of function. []? Progressing: []? Met: []? Not Met: []? Adjusted  2. Patient will demonstrate increased L knee AROM to 0 degrees extension to allow for proper joint functioning as indicated by patients Functional Deficits. []? Progressing: []? Met: []? Not Met: []? Adjusted  3. Patient will demonstrate an increase in LE strength to 5/5 in all directions tested bilaterally to allow for proper functional mobility as indicated by patients Functional Deficits. []? Progressing: []? Met: []? Not Met: []? Adjusted  4.  Patient will return to all functional activities without increased symptoms or restriction. []? Progressing: []? Met: []? Not Met: []? Adjusted  5. Patient will be able to ambulate over 500 ft with proper gait sequencing and no c/o pain. []? Progressing: []? Met: []? Not Met: []? Adjusted         Overall Progression Towards Functional goals/ Treatment Progress Update:  [] Patient is progressing as expected towards functional goals listed. [] Progression is slowed due to complexities/Impairments listed. [] Progression has been slowed due to co-morbidities. [x] Plan just implemented, too soon to assess goals progression <30days   [] Goals require adjustment due to lack of progress  [] Patient is not progressing as expected and requires additional follow up with physician  [] Other    Prognosis for POC: [x] Good [] Fair  [] Poor      Patient requires continued skilled intervention: [x] Yes  [] No    Treatment/Activity Tolerance:  [x] Patient able to complete treatment  [] Patient limited by fatigue  [] Patient limited by pain     [] Patient limited by other medical complications  [] Other: 3/22 responded well to treatment with no adverse effects. Patient Education:              3/22 Educated on precautions, use of ice and importance of HEP to improve ROM. Reviewed proper gait mechanics. PLAN: See eval  [] Continue per plan of care [] Alter current plan (see comments above)  [x] Plan of care initiated [] Hold pending MD visit [] Discharge      Electronically signed by:  Latesha Corcoran PT    Note: If patient does not return for scheduled/ recommended follow up visits, this note will serve as a discharge from care along with most recent update on progress.

## 2021-03-22 NOTE — PLAN OF CARE
[facility] [address] [place] [address]  Phone [phone]  Fax [phone]                                                       Physical Therapy Certification    Dear Referring Practitioner: Kayla Montgomery MD,    We had the pleasure of evaluating the following patient for physical therapy services at [address] [place] [address]. A summary of our findings can be found in the initial assessment below. This includes our plan of care. If you have any questions or concerns regarding these findings, please do not hesitate to contact me at the office phone number checked above. Thank you for the referral.       Physician Signature:_______________________________Date:__________________  By signing above (or electronic signature), therapists plan is approved by physician      Patient: Shan Giles   : 1934   MRN: [id number]  Referring Physician: Referring Practitioner: Kayla Montgomery MD      Evaluation Date: 3/22/2021      Medical Diagnosis Information:  Diagnosis: M25.561, M25.562, G89.29 (ICD-10-CM) - Chronic pain of both knees   Treatment Diagnosis: M25.562 Left knee pain, M2./662 Left knee stiffness                                         Insurance information: PT Insurance Information: [employer] Medicare     Precautions/ Contra-indications: HTN, Osteopenia     C-SSRS Triggered by Intake questionnaire (Past 2 wk assessment):   [x] No, Questionnaire did not trigger screening.   [] Yes, Patient intake triggered further evaluation      [] C-SSRS Screening completed  [] PCP notified via Plan of Care  [] Emergency services notified     Latex Allergy:  [x]NO      []YES  Preferred Language for Healthcare:   [x]English       []other:    SUBJECTIVE: Patient arrived to physical therapy with c/o bilateral knee pain.  L knee has been present for 8 years after she fell out of a motor home leading to knee arthroscopy. She has had knee pain since 6 weeks s/p L knee surgery. She treated with injections however they stopped working. Received stem cell injection 4 years ago giving her mild relief. Recommended she have a TKA on L however not able to d/t dental work/ infection. Symptoms located a medial aspect of L knee that is fluctuates based on activities. The R knee will occasionally give out on her with walking and has been going on for past couple years. Denies any significant pain on R. Feels like she has gotten weaker in her LE's and using a cane for past 3 weeks when she experiences increase in symptoms. Denies having any stairs in her home. D/t symptoms she had x-ray of L knee October 2020 demonstrating moderate tricompartmental OA. Will also have occasional R hip pain if she lays the wrong way. Pt would like to be able to walk 1 mile without pain.      Relevant Medical History: HTN, Osteopenia  Functional Disability Index: WOMAC 51% deficit    Pain Scale: 0-9/10  Easing factors: Heat, rest   Provocative factors: Walking, stairs, sit <> stand, morning stiffness that improves after 2-3 steps      Type: []Constant   [x]Intermittent  []Radiating [x]Localized []other:     Numbness/Tingling: Denies    Occupation/School: Retired    Living Status/Prior Level of Function: Independent with ADLs and IADLs,     OBJECTIVE:      Flexibility L R Comment   Hamstring -20 -15 Measured 90/90   Gastroc        ROM PROM AROM Overpressure Comment    L R L R L R    Flexion 140  137 142      Extension -4  -5 posterior knee strain 0                              Strength L R Comment   Quad 5 5    Hamstring 5 4+    Hip  flexion 4 4    Hip abd 4 4                    Special Test Results/Comment   Shelbi's  Neg bilat    Crepitus Mild noted on L with flexion   Flexion Test Neg   Valgus Laxity Neg   Varus Laxity Neg       Girth L R   Joint line  36 cm 33 cm     Reflexes/Sensation:    [x]Dermatomes/Myotomes intact personal factors that will affect rehab potential:              [x]Age  [x]Sex              []Motivation/Lack of Motivation                        [x]Co-Morbidities              []Cognitive Function, education/learning barriers              []Environmental, home barriers              []profession/work barriers  []past PT/medical experience  []other:  Justification: Pt's age, gender, diagnosis of osteopenia and chronic nature of symptoms are potential barriers to treatment    Falls Risk Assessment (30 days):   [x] Falls Risk assessed and no intervention required.   [] Falls Risk assessed and Patient requires intervention due to being higher risk   TUG score (>12s at risk):     [] Falls education provided, including       ASSESSMENT:   Functional Impairments:     [x]Noted lumbar/proximal hip/LE hypomobility   [x]Decreased LE functional ROM   [x]Decreased core/proximal hip strength and neuromuscular control   [x]Decreased LE functional strength   []Reduced balance/proprioceptive control   []other:      Functional Activity Limitations (from functional questionnaire and intake)   [x]Reduced ability to tolerate prolonged functional positions   [x]Reduced ability or difficulty with changes of positions or transfers between positions   [x]Reduced ability to maintain good posture and demonstrate good body mechanics with sitting, bending, and lifting   []Reduced ability to sleep   [] Reduced ability or tolerance with driving and/or computer work   [x]Reduced ability to perform lifting, carrying tasks   []Reduced ability to squat   []Reduced ability to forward bend   [x]Reduced ability to ambulate prolonged functional periods/distances/surfaces   [x]Reduced ability to ascend/descend stairs   []other:     Participation Restrictions   [x]Reduced participation in self care activities   [x]Reduced participation in home management activities   [x]Reduced participation in work activities   [x]Reduced participation in social activities. Classification :    []Signs/symptoms consistent with post-surgical status including decreased ROM, strength and function. []Signs/symptoms consistent with joint sprain/strain   []Signs/symptoms consistent with patella-femoral syndrome   [x]Signs/symptoms consistent with knee OA/hip OA   []Signs/symptoms consistent with internal derangement of knee/Hip   []Signs/symptoms consistent with functional hip weakness/NMR control      []Signs/symptoms consistent with tendinitis/tendinosis    []signs/symptoms consistent with pathology which may benefit from Dry needling      []other:      Prognosis/Rehab Potential:      []Excellent   [x]Good    []Fair   []Poor    Tolerance of evaluation/treatment:    []Excellent   [x]Good    []Fair   []Poor    Physical Therapy Evaluation Complexity Justification  [x] A history of present problem with:  [] no personal factors and/or comorbidities that impact the plan of care;  [x]1-2 personal factors and/or comorbidities that impact the plan of care  []3 personal factors and/or comorbidities that impact the plan of care  [x] An examination of body systems using standardized tests and measures addressing any of the following: body structures and functions (impairments), activity limitations, and/or participation restrictions;:  [x] a total of 1-2 or more elements   [] a total of 3 or more elements   [] a total of 4 or more elements   [x] A clinical presentation with:  [x] stable and/or uncomplicated characteristics   [] evolving clinical presentation with changing characteristics  [] unstable and unpredictable characteristics;   [x] Clinical decision making of [x] low, [] moderate, [] high complexity using standardized patient assessment instrument and/or measurable assessment of functional outcome.     [x] EVAL (LOW) 55689 (typically 20 minutes face-to-face)  [] EVAL (MOD) 98975 (typically 30 minutes face-to-face)  [] EVAL (HIGH) 99946 (typically 45 minutes face-to-face)  [] AROM to 0 degrees extension to allow for proper joint functioning as indicated by patients Functional Deficits. [] Progressing: [] Met: [] Not Met: [] Adjusted  3. Patient will demonstrate an increase in LE strength to 5/5 in all directions tested bilaterally to allow for proper functional mobility as indicated by patients Functional Deficits. [] Progressing: [] Met: [] Not Met: [] Adjusted  4. Patient will return to all functional activities without increased symptoms or restriction. [] Progressing: [] Met: [] Not Met: [] Adjusted  5. Patient will be able to ambulate over 500 ft with proper gait sequencing and no c/o pain. [] Progressing: [] Met: [] Not Met: [] Adjusted       Electronically signed by:  Sarahi Emanuel PT    Note: If patient does not return for scheduled/ recommended follow up visits, this note will serve as a discharge from care along with most recent update on progress.

## 2021-03-31 ENCOUNTER — HOSPITAL ENCOUNTER (OUTPATIENT)
Dept: PHYSICAL THERAPY | Age: 86
Setting detail: THERAPIES SERIES
Discharge: HOME | End: 2021-03-31
Payer: MEDICARE

## 2021-03-31 PROCEDURE — 97112 NEUROMUSCULAR REEDUCATION: CPT

## 2021-03-31 PROCEDURE — 97110 THERAPEUTIC EXERCISES: CPT

## 2021-03-31 NOTE — FLOWSHEET NOTE
[address]  Phone: [phone]   Fax:     [phone]    Physical Therapy Daily Treatment Note  Date:  3/31/2021    Patient Name:  Oral Christina    :  1934  MRN: [id number]  Restrictions/Precautions:    Medical/Treatment Diagnosis Information:  · Diagnosis: M25.561, M25.562, G89.29 (ICD-10-CM) - Chronic pain of both knees  · Treatment Diagnosis: M25.562 Left knee pain, M2.662 Left knee stiffness  Insurance/Certification information:  PT Insurance Information: A[employer]na Medicare  Physician Information:  Referring Practitioner: Jettie Osgood, MD  Has the plan of care been signed (Y/N):        [x]  Yes  []  No     Date of Patient follow up with Physician: PRN      Is this a Progress Report:     []  Yes  [x]  No        If Yes:  Date Range for reporting period:  Beginning 3/22/21  Ending    Progress report will be due (10 Rx or 30 days whichever is less):        Recertification will be due (POC Duration  / 90 days whichever is less): 21         Visit # Insurance Allowable Auth Required   2  3/31 A[employer]na Medicare []  Yes [x]  No        Functional Scale: WOMAC 51% deficit Date assessed:  3/22/21       Latex Allergy:  [x]NO      []YES  Preferred Language for Healthcare:   [x]English       []other:    Pain level:  0-5/10 3/31    SUBJECTIVE:  3/31 Pt states when she walks and turns to her R it feels like her knee is going to give out. Overall she is disappointed d/t leg feeling like it is going to give out. Has only complete LAQ and SLR consistently.      OBJECTIVE: See eval   Observation:    Test measurements:    ROM PROM AROM Overpressure Comment     L R L R L R     Flexion 140   137 142         Extension 0   0 +3                RESTRICTIONS/PRECAUTIONS: Osteopenia, HTN    Exercises/Interventions:   Exercise/Equipment Resistance/Repetitions Other comments   Stretching/AROM Calf stretch 30\"hx3 L ^3/31 long sitting with strap   Hamstring 30\"hx3 L ^3/31 seated EOT   LAQ 3x10 bilat ^3/31 seated EOT               Strengthening      SAQ 3x10 bilat 3/31 long sitting with bloster   SLR  0# 3x10 L 3/31 withheld on R d/t R hip joint pain                           Neuromuscular re-ed      Quad set 10\"hx10 bilat 3/31 1/2 roll under ankle   Hip abd isometric Blue TB 10\"hx10 bilat 3/31 long sitting   Hip add isometric Ball @ feet 10\"hx10 bilat 3/31 long sitting   TKE 3x10 bilat Blue TB 3/31   SLB                  Quantum machines       Leg press        Leg extension       Leg curl               Manual interventions                                   Therapeutic Exercise and NMR EXR  [x] (27074) Provided verbal/tactile cueing for activities related to strengthening, flexibility, endurance, ROM for improvements in LE, proximal hip, and core control with self care, mobility, lifting, ambulation.  [] (73429) Provided verbal/tactile cueing for activities related to improving balance, coordination, kinesthetic sense, posture, motor skill, proprioception  to assist with LE, proximal hip, and core control in self care, mobility, lifting, ambulation and eccentric single leg control.      NMR and Therapeutic Activities:    [] (46632 or 42240) Provided verbal/tactile cueing for activities related to improving balance, coordination, kinesthetic sense, posture, motor skill, proprioception and motor activation to allow for proper function of core, proximal hip and LE with self care and ADLs  [] (50358) Gait Re-education- Provided training and instruction to the patient for proper LE, core and proximal hip recruitment and positioning and eccentric body weight control with ambulation re-education including up and down stairs     Home Exercise Program:    [x] (62847) Reviewed/Progressed HEP activities related to strengthening, flexibility, endurance, ROM of core, proximal hip and LE for functional self-care, mobility, lifting and ambulation/stair navigation   [] (20668)Reviewed/Progressed HEP activities related to improving balance, coordination, kinesthetic sense, posture, motor skill, proprioception of core, proximal hip and LE for self care, mobility, lifting, and ambulation/stair navigation      Manual Treatments:  PROM / STM / Oscillations-Mobs:  G-I, II, III, IV (PA's, Inf., Post.)  [] (77684) Provided manual therapy to mobilize LE, proximal hip and/or LS spine soft tissue/joints for the purpose of modulating pain, promoting relaxation,  increasing ROM, reducing/eliminating soft tissue swelling/inflammation/restriction, improving soft tissue extensibility and allowing for proper ROM for normal function with self care, mobility, lifting and ambulation. Modalities:      Charges:  Timed Code Treatment Minutes: 45'   Total Treatment Minutes: 2:30-3:13  37'       [] EVAL (LOW) 455 1011 (typically 20 minutes face-to-face)  [] EVAL (MOD) 15724 (typically 30 minutes face-to-face)  [] EVAL (HIGH) 13408 (typically 45 minutes face-to-face)  [] RE-EVAL   [x] ET(54023) x  2   [] IONTO  [x] NMR (86211) x  1   [] VASO  [] Manual (09018) x      [] Other:  [] TA x      [] Mech Traction (19675)  [] ES(attended) (40923)      [] ES (un) (76154):     GOALS:   Patient stated goal: Return to walking pain free     []? Progressing: []? Met: []? Not Met: []? Adjusted     Therapist goals for Patient:   Short Term Goals: To be achieved in: 4 weeks  1. Independent in HEP and progression per patient tolerance, in order to prevent re-injury. []? Progressing: []? Met: []? Not Met: []? Adjusted   2. Patient will have a decrease in pain to facilitate improvement in movement, function, and ADLs as indicated by Functional Deficits. []? Progressing: []? Met: []? Not Met: []? Adjusted     Long Term Goals: To be achieved in: 8 weeks  1. Disability index score of 10% or less for the [facility] to assist with reaching prior level of function.    []? progress  3/22 Educated on precautions, use of ice and importance of HEP to improve ROM. Reviewed proper gait mechanics. [address] code: [id number]      PLAN: See candi  [x] Continue per plan of care [] Venkat Lowe current plan (see comments above)  [x] Plan of care initiated [] Hold pending MD visit [] Discharge  3/31 Continue to increase strength and knee stability as tolerated based on symptoms    Electronically signed by:  Brannon Merino PT    Note: If patient does not return for scheduled/ recommended follow up visits, this note will serve as a discharge from care along with most recent update on progress.

## 2021-04-02 ENCOUNTER — APPOINTMENT (OUTPATIENT)
Dept: PHYSICAL THERAPY | Age: 86
End: 2021-04-02
Payer: MEDICARE

## 2021-04-06 ENCOUNTER — HOSPITAL ENCOUNTER (OUTPATIENT)
Dept: PHYSICAL THERAPY | Age: 86
Setting detail: THERAPIES SERIES
Discharge: HOME | End: 2021-04-06
Payer: MEDICARE

## 2021-04-06 PROCEDURE — 97110 THERAPEUTIC EXERCISES: CPT

## 2021-04-06 PROCEDURE — 97112 NEUROMUSCULAR REEDUCATION: CPT

## 2021-04-06 NOTE — FLOWSHEET NOTE
The [address] [place] [address],[address], [address], [address] [place] [address]  Phone: [phone]   Fax:     [phone]    Physical Therapy Daily Treatment Note  Date:  2021    Patient Name:  Anabel Mendoza    :  1934  MRN: [id number]  Restrictions/Precautions:    Medical/Treatment Diagnosis Information:  · Diagnosis: M25.561, M25.562, G89.29 (ICD-10-CM) - Chronic pain of both knees  · Treatment Diagnosis: M25.562 Left knee pain, M2.662 Left knee stiffness  Insurance/Certification information:  PT Insurance Information: A[employer]na Medicare  Physician Information:  Referring Practitioner: Susy Jensen MD  Has the plan of care been signed (Y/N):        [x]  Yes  []  No     Date of Patient follow up with Physician: PRN      Is this a Progress Report:     []  Yes  [x]  No        If Yes:  Date Range for reporting period:  Beginning 3/22/21  Ending    Progress report will be due (10 Rx or 30 days whichever is less):  90      Recertification will be due (POC Duration  / 90 days whichever is less): 21         Visit # Insurance Allowable Auth Required   3   A[employer]na Medicare []  Yes [x]  No        Functional Scale: WOMAC 51% deficit Date assessed:  3/22/21       Latex Allergy:  [x]NO      []YES  Preferred Language for Healthcare:   [x]English       []other:    Pain level:  0-5/10     SUBJECTIVE:   Pt states she had relief after last visit and the following day. Pt had company over the weekend with her to being on her feet a lot causing discomfort in bilateral knees. States she still feels like it occasionally wants to give out on her but has not lost her balance or fallen.      OBJECTIVE: See eval   Observation:    Test measurements:    ROM PROM AROM Overpressure Comment     L R L R L R     Flexion 140   137 142         Extension 0   0 +3                RESTRICTIONS/PRECAUTIONS: Osteopenia, HTN    Exercises/Interventions: Exercise/Equipment Resistance/Repetitions Other comments   Stretching/AROM       Calf stretch 30\"hx3 L ^3/31 long sitting with strap   Hamstring 30\"hx3 L ^3/31 seated EOT   SB roll ins 3x10 bilat 4/6                     Strengthening      SAQ 1# 5\"h 2x10 bilat ^4/6 supine with bloster   SLR  1# 2x10 L ^4/6 stopped after 2nd set d/t fatigue    Bridges with BS 3x10 bilat 4/6    LAQ with BS  1# 3x10 bilat LE 4/6   Heel raise 3x10 bilat 4/6              Neuromuscular re-ed      Quad set 4/6 cont with HEP    Hip abd isometric Blue TB 10\"hx10 bilat 3/31 long sitting   Hip add isometric  4/6 added ball squeeze with LAQ and Bridges   TKE 3x10 bilat Black TB ^4/6    SLB 20\"hx3 ea LE 4/6 finger tip support required unilateral                 Quantum machines       Leg press        Leg extension       Leg curl               Manual interventions                                   Therapeutic Exercise and NMR EXR  [x] (54349) Provided verbal/tactile cueing for activities related to strengthening, flexibility, endurance, ROM for improvements in LE, proximal hip, and core control with self care, mobility, lifting, ambulation.  [] (28506) Provided verbal/tactile cueing for activities related to improving balance, coordination, kinesthetic sense, posture, motor skill, proprioception  to assist with LE, proximal hip, and core control in self care, mobility, lifting, ambulation and eccentric single leg control.      NMR and Therapeutic Activities:    [] (74690 or 36804) Provided verbal/tactile cueing for activities related to improving balance, coordination, kinesthetic sense, posture, motor skill, proprioception and motor activation to allow for proper function of core, proximal hip and LE with self care and ADLs  [] (48284) Gait Re-education- Provided training and instruction to the patient for proper LE, core and proximal hip recruitment and positioning and eccentric body weight control with ambulation re-education including up and down stairs     Home Exercise Program:    [x] (73985) Reviewed/Progressed HEP activities related to strengthening, flexibility, endurance, ROM of core, proximal hip and LE for functional self-care, mobility, lifting and ambulation/stair navigation   [] (28059)Reviewed/Progressed HEP activities related to improving balance, coordination, kinesthetic sense, posture, motor skill, proprioception of core, proximal hip and LE for self care, mobility, lifting, and ambulation/stair navigation      Manual Treatments:  PROM / STM / Oscillations-Mobs:  G-I, II, III, IV (PA's, Inf., Post.)  [] (80438) Provided manual therapy to mobilize LE, proximal hip and/or LS spine soft tissue/joints for the purpose of modulating pain, promoting relaxation,  increasing ROM, reducing/eliminating soft tissue swelling/inflammation/restriction, improving soft tissue extensibility and allowing for proper ROM for normal function with self care, mobility, lifting and ambulation. Modalities:      Charges:  Timed Code Treatment Minutes: 36'   Total Treatment Minutes: 1:13-1:55  43'       [] EVAL (LOW) 455 1011 (typically 20 minutes face-to-face)  [] EVAL (MOD) 52935 (typically 30 minutes face-to-face)  [] EVAL (HIGH) 06735 (typically 45 minutes face-to-face)  [] RE-EVAL   [x] CS(11915) x  2   [] IONTO  [x] NMR (36828) x  1   [] VASO  [] Manual (14385) x      [] Other:  [] TA x      [] Mech Traction (49712)  [] ES(attended) (55806)      [] ES (un) (78122):     GOALS:   Patient stated goal: Return to walking pain free     []? Progressing: []? Met: []? Not Met: []? Adjusted     Therapist goals for Patient:   Short Term Goals: To be achieved in: 4 weeks  1. Independent in HEP and progression per patient tolerance, in order to prevent re-injury. []? Progressing: []? Met: []? Not Met: []? Adjusted   2. Patient will have a decrease in pain to facilitate improvement in movement, function, and ADLs as indicated by Functional Deficits.     []?

## 2021-04-13 ENCOUNTER — HOSPITAL ENCOUNTER (OUTPATIENT)
Dept: PHYSICAL THERAPY | Age: 86
Setting detail: THERAPIES SERIES
Discharge: HOME | End: 2021-04-13
Payer: MEDICARE

## 2021-04-13 PROCEDURE — 97112 NEUROMUSCULAR REEDUCATION: CPT

## 2021-04-13 PROCEDURE — 97110 THERAPEUTIC EXERCISES: CPT

## 2021-04-13 NOTE — FLOWSHEET NOTE
comp   Hamstring 30\"hx3 bilat ^4/13 seated EOT   SB roll ins 3x10 bilat 4/6                     Strengthening      SAQ 2# 5\"h 2x10 bilat ^4/13 supine with bloster   SLR  2# 3x10 L  0# 2x10 R ^4/13   Bridges with BS 5\"h 2x10 bilat ^4/13    LAQ with BS  2# 3x10 bilat LE ^4/13   Heel raise 3x10 bilat 4/6   Hamstring curl Red TB 2x10 bilat 4/13   Marching Red TB 1x5 bilat  No resistance 2x10 bilat 4/13 D/c red band d/t R hip pain              Neuromuscular re-ed      Quad set 4/6 cont with HEP    Hip abd isometric Black TB 10\"hx10 bilat ^4/13 hook lying   Hip add isometric  4/6 added ball squeeze with LAQ and Bridges   TKE 3x10 bilat 3 plates ^9/30 completed with CC, cues required to avoid compensation from hips and trunk   SLB  4/13 cont with HEP, withheld d/t fatigue at end of teatment                Quantum machines       Leg press   40# 3x10 bilat 4/13   Leg extension       Leg curl               Manual interventions                                   Therapeutic Exercise and NMR EXR  [x] (90961) Provided verbal/tactile cueing for activities related to strengthening, flexibility, endurance, ROM for improvements in LE, proximal hip, and core control with self care, mobility, lifting, ambulation.  [] (89510) Provided verbal/tactile cueing for activities related to improving balance, coordination, kinesthetic sense, posture, motor skill, proprioception  to assist with LE, proximal hip, and core control in self care, mobility, lifting, ambulation and eccentric single leg control.      NMR and Therapeutic Activities:    [] (84254 or 53905) Provided verbal/tactile cueing for activities related to improving balance, coordination, kinesthetic sense, posture, motor skill, proprioception and motor activation to allow for proper function of core, proximal hip and LE with self care and ADLs  [] (63430) Gait Re-education- Provided training and instruction to the patient for proper LE, core and proximal hip recruitment and positioning and eccentric body weight control with ambulation re-education including up and down stairs     Home Exercise Program:    [x] (81322) Reviewed/Progressed HEP activities related to strengthening, flexibility, endurance, ROM of core, proximal hip and LE for functional self-care, mobility, lifting and ambulation/stair navigation   [] (81229)Reviewed/Progressed HEP activities related to improving balance, coordination, kinesthetic sense, posture, motor skill, proprioception of core, proximal hip and LE for self care, mobility, lifting, and ambulation/stair navigation      Manual Treatments:  PROM / STM / Oscillations-Mobs:  G-I, II, III, IV (PA's, Inf., Post.)  [] (05275) Provided manual therapy to mobilize LE, proximal hip and/or LS spine soft tissue/joints for the purpose of modulating pain, promoting relaxation,  increasing ROM, reducing/eliminating soft tissue swelling/inflammation/restriction, improving soft tissue extensibility and allowing for proper ROM for normal function with self care, mobility, lifting and ambulation. Modalities:      Charges:  Timed Code Treatment Minutes: 48'   Total Treatment Minutes: 2:39-3:34  55       [] EVAL (LOW) 76115 (typically 20 minutes face-to-face)  [] EVAL (MOD) 56751 (typically 30 minutes face-to-face)  [] EVAL (HIGH) 08035 (typically 45 minutes face-to-face)  [] RE-EVAL   [x] DX(80066) x  2   [] IONTO  [x] NMR (91085) x  1   [] VASO  [] Manual (20832) x      [] Other:  [] TA x      [] Mech Traction (54588)  [] ES(attended) (76905)      [] ES (un) (17788):     GOALS:   Patient stated goal: Return to walking pain free     []? Progressing: []? Met: []? Not Met: []? Adjusted     Therapist goals for Patient:   Short Term Goals: To be achieved in: 4 weeks  1. Independent in HEP and progression per patient tolerance, in order to prevent re-injury. []? Progressing: []? Met: []? Not Met: []? Adjusted   2.  Patient will have a decrease in pain to facilitate improvement in movement, function, and ADLs as indicated by Functional Deficits. []? Progressing: []? Met: []? Not Met: []? Adjusted     Long Term Goals: To be achieved in: 8 weeks  1. Disability index score of 10% or less for the [facility] to assist with reaching prior level of function. []? Progressing: []? Met: []? Not Met: []? Adjusted  2. Patient will demonstrate increased L knee AROM to 0 degrees extension to allow for proper joint functioning as indicated by patients Functional Deficits. []? Progressing: []? Met: []? Not Met: []? Adjusted  3. Patient will demonstrate an increase in LE strength to 5/5 in all directions tested bilaterally to allow for proper functional mobility as indicated by patients Functional Deficits. []? Progressing: []? Met: []? Not Met: []? Adjusted  4. Patient will return to all functional activities without increased symptoms or restriction. []? Progressing: []? Met: []? Not Met: []? Adjusted  5. Patient will be able to ambulate over 500 ft with proper gait sequencing and no c/o pain. []? Progressing: []? Met: []? Not Met: []? Adjusted         Overall Progression Towards Functional goals/ Treatment Progress Update:  [] Patient is progressing as expected towards functional goals listed. [] Progression is slowed due to complexities/Impairments listed. [] Progression has been slowed due to co-morbidities.   [x] Plan just implemented, too soon to assess goals progression <30days   [] Goals require adjustment due to lack of progress  [] Patient is not progressing as expected and requires additional follow up with physician  [] Other    Prognosis for POC: [x] Good [] Fair  [] Poor      Patient requires continued skilled intervention: [x] Yes  [] No    Treatment/Activity Tolerance:  [x] Patient able to complete treatment  [x] Patient limited by fatigue  [] Patient limited by pain     [] Patient limited by other medical complications  [] Other: 4/13 Limited with SLR flexion on R and marching with resistance d/t pain @ R hip joint which could be causing increase weakness on R and feeling of knee wanting to give out. Fatigued with increase in intensity of treatment and withheld SLB d/t fatigue. Needs to continues to work on strength to increase knee stability. Patient Education:              4/13 Updated HEP   4/6 Updated HEP   3/31 Educated on realistic expectations with ROM and strengthening exercises and their effect on overall function and progress  3/22 Educated on precautions, use of ice and importance of HEP to improve ROM. Reviewed proper gait mechanics. [address] [place] [address] code: [id number]      PLAN: See eval  [x] Continue per plan of care [] Alter current plan (see comments above)  [x] Plan of care initiated [] Hold pending MD visit [] Discharge  4/13 Complete progress note NPV to determine if she is appropriate for continued PT or should transition to HEP     Electronically signed by:  Candelaria Lucas PT    Note: If patient does not return for scheduled/ recommended follow up visits, this note will serve as a discharge from care along with most recent update on progress.

## 2021-04-20 ENCOUNTER — HOSPITAL ENCOUNTER (OUTPATIENT)
Dept: PHYSICAL THERAPY | Age: 86
Setting detail: THERAPIES SERIES
Discharge: HOME | End: 2021-04-20
Payer: MEDICARE

## 2021-04-20 ENCOUNTER — TELEPHONE (OUTPATIENT)
Dept: FAMILY MEDICINE CLINIC | Age: 86
End: 2021-04-20

## 2021-04-20 NOTE — FLOWSHEET NOTE
Physical Therapy  Cancellation/No-show Note  Patient Name:  Maci Pineda  :  1934   Date:  2021  Cancelled visits to date: 1  No-shows to date: 0    For today's appointment patient:  [x]  Cancelled  []  Rescheduled appointment  []  No-show     Reason given by patient:  []  Patient ill  []  Conflicting appointment  []  No transportation    []  Conflict with work  [x]  No reason given  []  Other:     Comments:      Electronically signed by:  Della Henry PT

## 2021-04-22 ENCOUNTER — OFFICE VISIT (OUTPATIENT)
Dept: FAMILY MEDICINE CLINIC | Age: 86
End: 2021-04-22
Payer: MEDICARE

## 2021-04-22 ENCOUNTER — TELEPHONE (OUTPATIENT)
Dept: FAMILY MEDICINE CLINIC | Age: 86
End: 2021-04-22

## 2021-04-22 ENCOUNTER — TELEPHONE (OUTPATIENT)
Dept: ADMINISTRATIVE | Age: 86
End: 2021-04-22

## 2021-04-22 VITALS
WEIGHT: 137 LBS | HEART RATE: 63 BPM | HEIGHT: 62 IN | DIASTOLIC BLOOD PRESSURE: 70 MMHG | SYSTOLIC BLOOD PRESSURE: 160 MMHG | OXYGEN SATURATION: 98 % | BODY MASS INDEX: 25.21 KG/M2

## 2021-04-22 DIAGNOSIS — M25.551 RIGHT HIP PAIN: Primary | ICD-10-CM

## 2021-04-22 DIAGNOSIS — I10 ESSENTIAL HYPERTENSION: ICD-10-CM

## 2021-04-22 DIAGNOSIS — M25.562 CHRONIC PAIN OF LEFT KNEE: ICD-10-CM

## 2021-04-22 DIAGNOSIS — G89.29 CHRONIC PAIN OF LEFT KNEE: ICD-10-CM

## 2021-04-22 PROCEDURE — 99214 OFFICE O/P EST MOD 30 MIN: CPT | Performed by: FAMILY MEDICINE

## 2021-04-22 SDOH — ECONOMIC STABILITY: FOOD INSECURITY: WITHIN THE PAST 12 MONTHS, YOU WORRIED THAT YOUR FOOD WOULD RUN OUT BEFORE YOU GOT MONEY TO BUY MORE.: NEVER TRUE

## 2021-04-22 SDOH — ECONOMIC STABILITY: INCOME INSECURITY: HOW HARD IS IT FOR YOU TO PAY FOR THE VERY BASICS LIKE FOOD, HOUSING, MEDICAL CARE, AND HEATING?: NOT HARD AT ALL

## 2021-04-22 SDOH — ECONOMIC STABILITY: TRANSPORTATION INSECURITY
IN THE PAST 12 MONTHS, HAS LACK OF TRANSPORTATION KEPT YOU FROM MEETINGS, WORK, OR FROM GETTING THINGS NEEDED FOR DAILY LIVING?: NO

## 2021-04-22 NOTE — PROGRESS NOTES
Deanna Mo   YOB: 1934    Date of Visit:  4/22/2021    Allergies   Allergen Reactions    Codeine Nausea And Vomiting    Sulfa Antibiotics Nausea And Vomiting     Outpatient Medications Marked as Taking for the 4/22/21 encounter (Office Visit) with Viet Gerard MD   Medication Sig Dispense Refill    diclofenac sodium (VOLTAREN) 1 % GEL Apply topically 2 times daily 60 g 1    lisinopril (PRINIVIL;ZESTRIL) 20 MG tablet TAKE ONE TABLET BY MOUTH TWICE A  tablet 0    zoster recombinant adjuvanted vaccine (SHINGRIX) 50 MCG/0.5ML SUSR injection 50 MCG IM then repeat 2-6 months. 0.5 mL 1    hydroCHLOROthiazide (MICROZIDE) 12.5 MG capsule Take 1 capsule by mouth every morning 90 capsule 3    amLODIPine (NORVASC) 10 MG tablet Take 1 tablet by mouth daily 90 tablet 3    citalopram (CELEXA) 20 MG tablet TAKE ONE TABLET BY MOUTH DAILY 90 tablet 3    fluticasone (FLONASE) 50 MCG/ACT nasal spray 2 sprays by Each Nare route daily 3 Bottle 1    vitamin D (CHOLECALCIFEROL) 1000 units TABS tablet Take 1,000 Units by mouth daily      Naproxen Sodium (ALEVE PO) Take by mouth 2 times daily      Bioflavonoid Products (BIOFLEX PO) Take by mouth daily      DORZOLAMIDE HCL-TIMOLOL MAL OP Apply to eye 1 drop every 12 hours      Flaxseed, Linseed, (FLAXSEED OIL) 1000 MG CAPS Take  by mouth.  Multiple Vitamins-Minerals (THERAPEUTIC MULTIVITAMIN-MINERALS) tablet Take 1 tablet by mouth daily.  Ascorbic Acid (VITAMIN C) 500 MG tablet Take 500 mg by mouth daily. Vitals:    04/22/21 0848 04/22/21 0909   BP: (!) 180/82 (!) 160/70   Pulse: 63    SpO2: 98%    Weight: 137 lb (62.1 kg)    Height: 5' 2\" (1.575 m)      Body mass index is 25.06 kg/m².      Wt Readings from Last 3 Encounters:   04/22/21 137 lb (62.1 kg)   10/01/20 137 lb (62.1 kg)   08/06/19 147 lb (66.7 kg)     BP Readings from Last 3 Encounters:   04/22/21 (!) 160/70   10/01/20 120/70   08/06/19 136/72        Chief Complaint Patient presents with    Hip Pain     R hip pain        HPI    HTN:  Tolerating medications well and taking them as directed. Did not take her HCTZ yet today as did not want to have to use the bathroom on the way here. Has not been checking her BP at home lately. She is taking the lisinopril BID -went to once a day but BP was high so went back to BID awhile ago.   No symptoms concerning for end organ damage are present. BMP today. Monitor and let me know if there are any issues.      L knee pain: S/p PT which helped a little bit. Present for a year +. Her hip started hurting when going to PT so stopped PT.      R hip pain:  Pain started about 4 weeks ago. It is keeping her awake at night. No injuries. No falls. Takes aleve and glucosamine. No numnbess or tingling. No weakness. Major depression: Stable on celexa.      Glaucoma: The patient sees an eye doctor regularly.         Osteoarthritis:  Taking aleve BID. Drinking milk. She does not want to take the omeprazole.   Discussed risks of ulcer and GI bleed.  Patient demonstrates understanding and and adamant that she needs to take this twice a day despite the risks.  She reports Tylenol does not help.  She does not care to try any other additional treatments. Discussed increased risk of bleed especially while she is taking Celexa.  She still prefers to continue the Aleve and Celexa.            SH: lives alone. Fanny Orozco is in [place]. Sees her a lot. Hx of living at [place] [address] when second  was alive. Had 6 children in 8 years.  1  of AIDS and another of cancer.       RTC 3 mo for BP    Social History     Socioeconomic History    Marital status:      Spouse name: Not on file    Number of children: Not on file    Years of education: Not on file    Highest education level: Not on file   Occupational History    Not on file   Social Needs    Financial resource strain: Not hard at all    Food insecurity     Worry: Never true     Inability: Never true    Transportation needs     Medical: No     Non-medical: No   Tobacco Use    Smoking status: Never Smoker    Smokeless tobacco: Never Used   Substance and Sexual Activity    Alcohol use: No     Alcohol/week: 0.0 standard drinks    Drug use: Not on file    Sexual activity: Not Currently     Partners: Male   Lifestyle    Physical activity     Days per week: Not on file     Minutes per session: Not on file    Stress: Not on file   Relationships    Social connections     Talks on phone: Not on file     Gets together: Not on file     Attends Hoahaoism service: Not on file     Active member of club or organization: Not on file     Attends meetings of clubs or organizations: Not on file     Relationship status: Not on file    Intimate partner violence     Fear of current or ex partner: Not on file     Emotionally abused: Not on file     Physically abused: Not on file     Forced sexual activity: Not on file   Other Topics Concern    Not on file   Social History Narrative    Remarried 3/2013 3 years after previous  . Was living at [facility] but moved out 2013 as new  did not like it. Met  as his daughter and her daughter were long time friends. 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 Review of Systems  As documented in the HPI. Currently no complaints of CP or BROOKE. Physical Exam  Constitutional:       General: She is not in acute distress. Appearance: She is well-developed. HENT:      Head: Normocephalic and atraumatic. Cardiovascular:      Rate and Rhythm: Normal rate and regular rhythm. Heart sounds: Normal heart sounds. No murmur. Pulmonary:      Effort: Pulmonary effort is normal. No respiratory distress. Breath sounds: Normal breath sounds. Musculoskeletal:      Comments: TTP over the R trochanteric bursa. Skin:     General: Skin is warm and dry. Neurological:      Mental Status: She is alert.    Psychiatric:         Behavior: Behavior normal.           Assessment/Plan     1. Right hip pain  Persistent. Discussed injection for trochanteric bursitis but unfortunately we had no kenalog. Discussed options. Referral to ortho. Given voltaren gel. Return precautions reviewed. - Ambulatory referral to Orthopedic Surgery    2. Chronic pain of left knee  Some improvement with PT but has persisted. F/u with ortho. She would like to go to [facility]. - Ambulatory referral to Orthopedic Surgery    3. Essential hypertension  High today. Continue meds. Monitor at home. Bring cuff to next visit. Let me know prior to 2 month if running very high after HCTZ still. Discussed medications with patient, who voiced understanding of their use and indications. All questions answered. Return in about 2 months (around 6/22/2021) for HTN. Documentation was done using voice recognition dragon software. Efforts were made to ensure accuracy; however, inadvertent, unintentional computerized transcription errors may be present. --Teagan Cazares MD on 4/22/2021    An electronic signature was used to authenticate this note.

## 2021-04-22 NOTE — PATIENT INSTRUCTIONS
Patient Education        Learning About a Hip Bursa Injection  What is a hip bursa injection? A bursa is a small sac of fluid that cushions an area between tendons and bones. The bursa on the outer side of the hip bone is called the trochanteric (say \"GJPT-axj-EJZM-ik\") bursa. Sometimes it can become swollen and painful. This condition is called bursitis. An injection into the bursa is a shot of medicine to reduce pain and swelling. The medicines may include pain relievers and steroid medicines. A steroid shot can sometimes help with short-term pain relief when other treatments haven't worked. How is a hip bursa injection done? First the area over the bursa will be cleaned. Your doctor may use a tiny needle to numb the skin over the area where you will get the injection. If a tiny needle is used to numb the area, your doctor will use another needle to inject the medicine. Your doctor may use a pain reliever, a steroid, or both. You may feel some pressure or discomfort. Your doctor may put ice on the area before you go home. What can you expect after the injection? You will probably go home soon after your shot. You may have numbness over your hip for a few hours. If your shot included both a pain reliever and a steroid, the pain will probably go away right away. But it might come back after a few hours. This might happen if the pain reliever wears off and the steroid has not started to work yet. The steroid medicine generally takes a few days to work. If the pain comes back, you can put ice or a cold pack on your hip for 10 to 20 minutes at a time. Put a thin cloth between the ice and your skin. Follow your doctor's instructions carefully. Avoid strenuous activities on the day you get the shot, especially those that put stress on your hip. Steroids don't always work. But when they do, the pain relief can last for several days to a few months or longer.   Follow-up care is a key part of your treatment and safety. Be sure to make and go to all appointments, and call your doctor if you are having problems. It's also a good idea to know your test results and keep a list of the medicines you take. Where can you learn more? Go to https://chankita.Strikingly. org and sign in to your [employer] account. Enter V168 in the [facility] box to learn more about \"Learning About a Hip Bursa Injection. \"     If you do not have an account, please click on the \"Sign Up Now\" link. Current as of: November 16, 2020               Content Version: 12.8  © 2006-2021 [employer]. Care instructions adapted under license by [facility] ([facility]). If you have questions about a medical condition or this instruction, always ask your healthcare professional. [address] any warranty or liability for your use of this information. Patient Education        Trochanteric Bursitis: Exercises  Introduction  Here are some examples of exercises for you to try. The exercises may be suggested for a condition or for rehabilitation. Start each exercise slowly. Ease off the exercises if you start to have pain. You will be told when to start these exercises and which ones will work best for you. How to do the exercises  Hamstring wall stretch   1. Lie on your back in a doorway, with your good leg through the open door. 2. Slide your affected leg up the wall to straighten your knee. You should feel a gentle stretch down the back of your leg. 3. Hold the stretch for at least 1 minute to begin. Then try to lengthen the time you hold the stretch to as long as 6 minutes. 4. Repeat 2 to 4 times. 5. If you do not have a place to do this exercise in a doorway, there is another way to do it:  6. Lie on your back, and bend the knee of your affected leg. 7. Loop a towel under the ball and toes of that foot, and hold the ends of the towel in your hands.   8. Straighten your knee, and slowly pull back on the stretch   1. Lie on your back with your legs straight. 2. Lift your affected leg and bend your knee. With your opposite hand, reach across your body, and then gently pull your knee toward your opposite shoulder. 3. Hold the stretch for 15 to 30 seconds. 4. Repeat 2 to 4 times. Double knee-to-chest   1. Lie on your back with your knees bent and your feet flat on the floor. You can put a small pillow under your head and neck if it is more comfortable. 2. Bring both knees to your chest.  3. Keep your lower back pressed to the floor. Hold for 15 to 30 seconds. 4. Relax, and lower your knees to the starting position. 5. Repeat 2 to 4 times. Follow-up care is a key part of your treatment and safety. Be sure to make and go to all appointments, and call your doctor if you are having problems. It's also a good idea to know your test results and keep a list of the medicines you take. Where can you learn more? Go to https://[employer].CommuniClique. org and sign in to your [employer] account. Enter Y945 in the [employer] box to learn more about \"Trochanteric Bursitis: Exercises. \"     If you do not have an account, please click on the \"Sign Up Now\" link. Current as of: November 16, 2020               Content Version: 12.8  © 8713-2366 [employer]. Care instructions adapted under license by [facility] ([facility]). If you have questions about a medical condition or this instruction, always ask your healthcare professional. [place] [address] any warranty or liability for your use of this information.

## 2021-04-22 NOTE — TELEPHONE ENCOUNTER
Submitted PA for Diclofenac Sodium 1% gel. Betty WEBER Case ID: [id number] - Rx #: [id number]. Via CM STATUS: APPROVED 1/1/2021-12/31/2021. Letter attached. Please notify patient, thank you.

## 2021-04-26 ENCOUNTER — TELEPHONE (OUTPATIENT)
Dept: FAMILY MEDICINE CLINIC | Age: 86
End: 2021-04-26

## 2021-04-26 DIAGNOSIS — I10 ESSENTIAL HYPERTENSION: ICD-10-CM

## 2021-04-26 NOTE — TELEPHONE ENCOUNTER
Patient called and is requesting to come in office today to have a  Kenalog injection in her hip. Does she need to see Dr. Linda Hicks for this injection? I checked with [facility] and we do have this available.

## 2021-04-26 NOTE — TELEPHONE ENCOUNTER
Medication:   Requested Prescriptions     Pending Prescriptions Disp Refills    lisinopril (PRINIVIL;ZESTRIL) 20 MG tablet [Pharmacy Med Name: LISINOPRIL 20 MG TABLET] 180 tablet 0     Sig: TAKE ONE TABLET BY MOUTH TWICE A DAY        Last Filled:  11/27/2020 180 tabs 0 refills     Patient Phone Number: [phone] (home)     Last appt: 4/22/2021   Next appt: 6/24/2021    Last OARRS: No flowsheet data found.

## 2021-04-26 NOTE — TELEPHONE ENCOUNTER
We did not have kenalog last week. .. Kristian Wells please confirm we have kenalog that is NOT , lidocaine without epi, and a 1.5 inch needle. If we do have all these things she can come in the same day slot or end of the day. Can you leave all these things on my desk if you go find them please if she is coming.

## 2021-04-27 ENCOUNTER — TELEPHONE (OUTPATIENT)
Dept: FAMILY MEDICINE CLINIC | Age: 86
End: 2021-04-27

## 2021-04-27 RX ORDER — LISINOPRIL 20 MG/1
TABLET ORAL
Qty: 180 TABLET | Refills: 0 | Status: SHIPPED | OUTPATIENT
Start: 2021-04-27 | End: 2021-07-19

## 2021-04-27 NOTE — TELEPHONE ENCOUNTER
Refilled. Pt came in for an appointment yesterday but left prior to being seen. Please call her to see if she would like to reschedule for the injection or she can plan to see ortho as we discussed at her last visit. Or she has an appointment in June and if needed we can readdress at that visit (please remind her of the date of that appt).

## 2021-04-27 NOTE — TELEPHONE ENCOUNTER
Patient states that the reason she left yesterday is because she heard Dr. Sang Castro laughing and carrying on with another patient. She states she waited for an hour when she was in pain. She has an appointment with [facility]. But, no one told her she was running late. She couldn't handle the pain anymore.

## 2021-04-29 NOTE — TELEPHONE ENCOUNTER
Patient left her appointment because she waited almost an hour for Dr. Fanny Schwartz. Has anyone contacted her to make another appointment?

## 2021-05-17 ENCOUNTER — OFFICE VISIT (OUTPATIENT)
Dept: FAMILY MEDICINE CLINIC | Age: 86
End: 2021-05-17
Payer: MEDICARE

## 2021-05-17 VITALS
HEART RATE: 62 BPM | SYSTOLIC BLOOD PRESSURE: 130 MMHG | WEIGHT: 137 LBS | OXYGEN SATURATION: 98 % | DIASTOLIC BLOOD PRESSURE: 82 MMHG | HEIGHT: 62 IN | BODY MASS INDEX: 25.21 KG/M2

## 2021-05-17 DIAGNOSIS — M19.90 OSTEOARTHRITIS, UNSPECIFIED OSTEOARTHRITIS TYPE, UNSPECIFIED SITE: ICD-10-CM

## 2021-05-17 DIAGNOSIS — I10 ESSENTIAL HYPERTENSION: Primary | ICD-10-CM

## 2021-05-17 DIAGNOSIS — F32.A DEPRESSION, UNSPECIFIED DEPRESSION TYPE: ICD-10-CM

## 2021-05-17 PROCEDURE — 99214 OFFICE O/P EST MOD 30 MIN: CPT | Performed by: FAMILY MEDICINE

## 2021-05-17 RX ORDER — CITALOPRAM 20 MG/1
TABLET ORAL
Qty: 90 TABLET | Refills: 3 | Status: SHIPPED | OUTPATIENT
Start: 2021-05-17 | End: 2022-04-21

## 2021-05-20 ENCOUNTER — TELEPHONE (OUTPATIENT)
Dept: FAMILY MEDICINE CLINIC | Age: 86
End: 2021-05-20

## 2021-05-20 NOTE — TELEPHONE ENCOUNTER
Pt stated that her sinus issues is not getting better and is having very yellow mucus come up and feels like its all in her head. Pt wanted to see if there is anything she can get to help with that.      Please advise pt     Phone # [phone]

## 2021-05-21 NOTE — TELEPHONE ENCOUNTER
Hilda MARTIN [facility] Support  Subject: Message to Provider     QUESTIONS   Information for Provider? pt called in at 10:39am with yellow mucus. no one has contacted her. wanting to see if there is an antibiotic that could get called in, or coughing pills. currently taking zinc therapy and its not helping. please contact asap.   ---------------------------------------------------------------------------   --------------   CALL BACK INFO   What is the best way for the office to contact you? OK to leave message on   voicemail   Preferred Call Back Phone Number? [phone]   ---------------------------------------------------------------------------   --------------   SCRIPT ANSWERS   Relationship to Patient?  Self

## 2021-06-02 ENCOUNTER — OFFICE VISIT (OUTPATIENT)
Dept: FAMILY MEDICINE CLINIC | Age: 86
End: 2021-06-02
Payer: MEDICARE

## 2021-06-02 VITALS
SYSTOLIC BLOOD PRESSURE: 136 MMHG | DIASTOLIC BLOOD PRESSURE: 80 MMHG | HEART RATE: 59 BPM | WEIGHT: 139 LBS | BODY MASS INDEX: 25.58 KG/M2 | OXYGEN SATURATION: 97 % | HEIGHT: 62 IN

## 2021-06-02 DIAGNOSIS — L24.A9 WOUND DRAINAGE: Primary | ICD-10-CM

## 2021-06-02 PROCEDURE — 99212 OFFICE O/P EST SF 10 MIN: CPT | Performed by: FAMILY MEDICINE

## 2021-06-02 NOTE — PROGRESS NOTES
Anabel Mendoza (:  1934) is a 80 y.o. female,Established patient, here for evaluation of the following chief complaint(s): Other (Patient reports hysterectomy 17 years ago, concerned about incision site. About 4 weeks ago she states it \"filled up with puss and blood. \" She is considering a hip replacement and would like to be sure she does not have an infection.)         ASSESSMENT/PLAN:  1. Wound drainage  -     Culture, Wound  very low prob for infection. Will culture since was able to express small amount of sebaceous material      No follow-ups on file. Subjective   SUBJECTIVE/OBJECTIVE:  HPI   Pt is a of 80 y.o. female comes in today with   Chief Complaint   Patient presents with    Other     Patient reports hysterectomy 17 years ago, concerned about incision site. About 4 weeks ago she states it \"filled up with puss and blood. \" She is considering a hip replacement and would like to be sure she does not have an infection. Considering hip replacement but worried about infection. [de-identified] bottom of old hysterectomy site is infected. Has had a few episodes where it drained a teaspoon of white    Vitals:    21 1052   BP: 136/80   Site: Left Upper Arm   Position: Sitting   Cuff Size: Medium Adult   Pulse: 59   SpO2: 97%   Weight: 139 lb (63 kg)   Height: 5' 2\" (1.575 m)      Review of Systems       Objective   Physical Exam             An electronic signature was used to authenticate this note.     --Kanika Salguero MD

## 2021-06-06 LAB
GRAM STAIN RESULT: ABNORMAL
ORGANISM: ABNORMAL
WOUND/ABSCESS: ABNORMAL

## 2021-06-07 ENCOUNTER — TELEPHONE (OUTPATIENT)
Dept: FAMILY MEDICINE CLINIC | Age: 86
End: 2021-06-07

## 2021-06-07 RX ORDER — CEPHALEXIN 500 MG/1
500 CAPSULE ORAL 4 TIMES DAILY
Qty: 28 CAPSULE | Refills: 0 | Status: SHIPPED | OUTPATIENT
Start: 2021-06-07 | End: 2021-06-14

## 2021-06-15 ENCOUNTER — TELEPHONE (OUTPATIENT)
Dept: FAMILY MEDICINE CLINIC | Age: 86
End: 2021-06-15

## 2021-06-15 NOTE — TELEPHONE ENCOUNTER
Pt stated she just finished her keflex and would like to know if she should come in to get checked out to make sure it's gone. Pt is hoping to get hip surgery so she needs to insure the infection is gone.      Please advise     Phone # [phone]

## 2021-06-25 ENCOUNTER — OFFICE VISIT (OUTPATIENT)
Dept: FAMILY MEDICINE CLINIC | Age: 86
End: 2021-06-25
Payer: MEDICARE

## 2021-06-25 VITALS
WEIGHT: 132.8 LBS | HEART RATE: 60 BPM | BODY MASS INDEX: 24.44 KG/M2 | HEIGHT: 62 IN | SYSTOLIC BLOOD PRESSURE: 140 MMHG | OXYGEN SATURATION: 98 % | DIASTOLIC BLOOD PRESSURE: 60 MMHG

## 2021-06-25 DIAGNOSIS — L98.9 SKIN LESION: Primary | ICD-10-CM

## 2021-06-25 PROCEDURE — 99212 OFFICE O/P EST SF 10 MIN: CPT | Performed by: NURSE PRACTITIONER

## 2021-07-07 ENCOUNTER — OFFICE VISIT (OUTPATIENT)
Dept: FAMILY MEDICINE CLINIC | Age: 86
End: 2021-07-07
Payer: MEDICARE

## 2021-07-07 VITALS
SYSTOLIC BLOOD PRESSURE: 106 MMHG | WEIGHT: 135 LBS | HEIGHT: 62 IN | OXYGEN SATURATION: 97 % | DIASTOLIC BLOOD PRESSURE: 78 MMHG | BODY MASS INDEX: 24.84 KG/M2 | HEART RATE: 66 BPM

## 2021-07-07 DIAGNOSIS — Z01.818 PREOP EXAMINATION: Primary | ICD-10-CM

## 2021-07-07 DIAGNOSIS — I10 ESSENTIAL HYPERTENSION: ICD-10-CM

## 2021-07-07 DIAGNOSIS — M16.10 PRIMARY OSTEOARTHRITIS OF HIP, UNSPECIFIED LATERALITY: ICD-10-CM

## 2021-07-07 PROCEDURE — 99213 OFFICE O/P EST LOW 20 MIN: CPT | Performed by: FAMILY MEDICINE

## 2021-07-07 ASSESSMENT — ENCOUNTER SYMPTOMS: RESPIRATORY NEGATIVE: 1

## 2021-07-07 NOTE — PROGRESS NOTES
Vy Adjutant (:  1934) is a 80 y.o. female,Established patient, here for evaluation of the following chief complaint(s):  Pre-op Exam (2021, Dr. Shruti Tyson, [place] [facility], right hip surgery)         ASSESSMENT/PLAN:  Pankaj Moya was seen today for pre-op exam.    Diagnoses and all orders for this visit:    Preop examination  Medically patient is at acceptable risk to undergo planned surgery. Essential hypertension  Has been well controlled  Hold lisinopril night before surgery  Primary osteoarthritis of hip, unspecified laterality  Failed conservative management       No follow-ups on file. Subjective   SUBJECTIVE/OBJECTIVE:  HPI   Pt is a of 80 y.o. female comes in today with   Chief Complaint   Patient presents with    Pre-op Exam     2021, Dr. Shruti Tyson, [place], right hip surgery     No personal or family history of adverse reaction to anesthesia or bleeding tendency.      Vitals:    21 1301   BP: 106/78   Pulse: 66   SpO2: 97%   Weight: 135 lb (61.2 kg)   Height: 5' 2\" (1.575 m)     Allergies   Allergen Reactions    Codeine Nausea And Vomiting    Sulfa Antibiotics Nausea And Vomiting       Current Outpatient Medications on File Prior to Visit   Medication Sig Dispense Refill    Docusate Calcium (STOOL SOFTENER PO) Take by mouth      citalopram (CELEXA) 20 MG tablet TAKE ONE TABLET BY MOUTH DAILY 90 tablet 3    lisinopril (PRINIVIL;ZESTRIL) 20 MG tablet TAKE ONE TABLET BY MOUTH TWICE A  tablet 0    hydroCHLOROthiazide (MICROZIDE) 12.5 MG capsule Take 1 capsule by mouth every morning 90 capsule 3    amLODIPine (NORVASC) 10 MG tablet Take 1 tablet by mouth daily 90 tablet 3    vitamin D (CHOLECALCIFEROL) 1000 units TABS tablet Take 1,000 Units by mouth daily      Naproxen Sodium (ALEVE PO) Take by mouth 2 times daily      Bioflavonoid Products (BIOFLEX PO) Take by mouth daily      DORZOLAMIDE HCL-TIMOLOL MAL OP Apply to eye 1 drop every 12 hours  Flaxseed, Linseed, (FLAXSEED OIL) 1000 MG CAPS Take  by mouth.  Multiple Vitamins-Minerals (THERAPEUTIC MULTIVITAMIN-MINERALS) tablet Take 1 tablet by mouth daily.  Ascorbic Acid (VITAMIN C) 500 MG tablet Take 1,000 mg by mouth daily       zoster recombinant adjuvanted vaccine (SHINGRIX) 50 MCG/0.5ML SUSR injection 50 MCG IM then repeat 2-6 months. (Patient not taking: Reported on 2021) 0.5 mL 1     No current facility-administered medications on file prior to visit. Past Medical History:   Diagnosis Date    Bradycardia     Cerumen impaction     Depression     Glaucoma     Hypertension     Osteoarthritis        Past Surgical History:   Procedure Laterality Date    APPENDECTOMY      BREAST LUMPECTOMY      HYSTERECTOMY  2004    KNEE SURGERY Left        Social History     Socioeconomic History    Marital status:      Spouse name: None    Number of children: None    Years of education: None    Highest education level: None   Occupational History    None   Tobacco Use    Smoking status: Never Smoker    Smokeless tobacco: Never Used   Substance and Sexual Activity    Alcohol use: No     Alcohol/week: 0.0 standard drinks    Drug use: None    Sexual activity: Not Currently     Partners: Male   Other Topics Concern    None   Social History Narrative    Remarried 3/2013 3 years after previous  . Was living at [facility] but moved out 2013 as new  did not like it. Met  as his daughter and her daughter were long time friends. 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 Social Determinants of Health     Financial Resource Strain: Low Risk     Difficulty of Paying Living Expenses: Not hard at all   Food Insecurity: No Food Insecurity    Worried About Running Out of Food in the Last Year: Never true    Gera of Food in the Last Year: Never true   Transportation Needs: No Transportation Needs    Lack of Transportation (Medical):  No    Lack of Transportation (Non-Medical): No   Physical Activity:     Days of Exercise per Week:     Minutes of Exercise per Session:    Stress:     Feeling of Stress :    Social Connections:     Frequency of Communication with Friends and Family:     Frequency of Social Gatherings with Friends and Family:     Attends Denominational Services:     Active Member of Clubs or Organizations:     Attends Club or Organization Meetings:     Marital Status:    Intimate Partner Violence:     Fear of Current or Ex-Partner:     Emotionally Abused:     Physically Abused:     Sexually Abused:        Social History     Substance and Sexual Activity   Drug Use Not on file       Family History   Problem Relation Age of Onset    Stroke Father     High Blood Pressure Father     Stroke Brother     High Blood Pressure Brother     Alzheimer's Disease Mother     High Blood Pressure Mother     Diabetes Mother         Review of Systems   Constitutional: Negative. Respiratory: Negative. Cardiovascular: Negative. Genitourinary: Negative. Hematological: Does not bruise/bleed easily. Objective   Physical Exam  Constitutional:       General: She is not in acute distress. Appearance: Normal appearance. She is well-developed and normal weight. She is not diaphoretic. HENT:      Head: Normocephalic and atraumatic. Eyes:      General: No scleral icterus. Neck:      Thyroid: No thyroid mass or thyromegaly. Trachea: No tracheal deviation. Cardiovascular:      Rate and Rhythm: Normal rate and regular rhythm. Heart sounds: Normal heart sounds. No murmur heard. Pulmonary:      Effort: Pulmonary effort is normal.      Breath sounds: Normal breath sounds. Musculoskeletal:      Cervical back: Normal range of motion and neck supple. Lymphadenopathy:      Head:      Right side of head: No submandibular adenopathy. Left side of head: No submandibular adenopathy. Cervical: No cervical adenopathy. Skin:     General: Skin is warm and dry. Findings: No rash. Neurological:      Mental Status: She is alert and oriented to person, place, and time. Cranial Nerves: No cranial nerve deficit. Psychiatric:         Behavior: Behavior normal.         Thought Content: Thought content normal.         Judgment: Judgment normal.              An electronic signature was used to authenticate this note.     --Rachael Muñoz MD

## 2021-07-12 ENCOUNTER — TELEPHONE (OUTPATIENT)
Dept: FAMILY MEDICINE CLINIC | Age: 86
End: 2021-07-12

## 2021-07-12 NOTE — TELEPHONE ENCOUNTER
Will need to see pain management unless there's some reason that 2 weeks after hip surgery she still needs norco

## 2021-07-12 NOTE — TELEPHONE ENCOUNTER
Verbally spoke with PCP and advised patient per his recommendation to take tylenol prior to the surgery.

## 2021-07-12 NOTE — TELEPHONE ENCOUNTER
New Hydrocodone Rx needed. Pt's LOV was 07/02 for her Pre-Op for right hip surgery. [facility] told the pt to request a 2 week supply of Hydrocodone so that that she can take if 2 weeks before her surgery. Pt will have to stop taking your Aleve. Pt's surgery is on 07/27 and pt will only be able to take it until 07/26. Please call pt and advise pt if this will be possible or if she needs to be seen. Send to [facility] on [address].

## 2021-07-12 NOTE — TELEPHONE ENCOUNTER
Patient states that Dr. Courtney Montes nurse told her that by the [facility] he is only allowed to prescribe norco post-surgery and not prior and therefor suggested the patient contact her PCP to start this medication now.

## 2021-07-15 ENCOUNTER — TELEPHONE (OUTPATIENT)
Dept: FAMILY MEDICINE CLINIC | Age: 86
End: 2021-07-15

## 2021-07-15 NOTE — TELEPHONE ENCOUNTER
Re-faxed Pre-Op H&P per Nena Mckee at [facility]. She gave new fax number for [facility] [phone]. I re-faxed forms today at 11:50.

## 2021-07-19 DIAGNOSIS — I10 ESSENTIAL HYPERTENSION: ICD-10-CM

## 2021-07-19 RX ORDER — LISINOPRIL 20 MG/1
TABLET ORAL
Qty: 180 TABLET | Refills: 0 | Status: SHIPPED | OUTPATIENT
Start: 2021-07-19 | End: 2021-10-18

## 2021-07-19 NOTE — TELEPHONE ENCOUNTER
Medication:   Requested Prescriptions     Pending Prescriptions Disp Refills    lisinopril (PRINIVIL;ZESTRIL) 20 MG tablet [Pharmacy Med Name: LISINOPRIL 20 MG TABLET] 180 tablet 0     Sig: TAKE ONE TABLET BY MOUTH TWICE A DAY        Last Filled:  4/27/21 #180tabs R0    Patient Phone Number: [phone] (home)     Last appt: 4/22/2021    Next appt:   Return in about 2 months (around 6/22/2021) for HTN      Last OARRS: No flowsheet data found.

## 2021-10-09 DIAGNOSIS — I10 ESSENTIAL HYPERTENSION: ICD-10-CM

## 2021-10-11 RX ORDER — AMLODIPINE BESYLATE 10 MG/1
TABLET ORAL
Qty: 90 TABLET | Refills: 3 | Status: SHIPPED | OUTPATIENT
Start: 2021-10-11 | End: 2022-02-09 | Stop reason: SDUPTHER

## 2021-10-11 NOTE — TELEPHONE ENCOUNTER
Last OV 7/7/21  Next OV Visit date not found    Requested Prescriptions     Pending Prescriptions Disp Refills    amLODIPine (NORVASC) 10 MG tablet [Pharmacy Med Name: amLODIPine BESYLATE 10 MG TAB] 90 tablet 3     Sig: TAKE ONE TABLET BY MOUTH DAILY    last fill 10/1/20  pended

## 2021-10-17 DIAGNOSIS — I10 ESSENTIAL HYPERTENSION: ICD-10-CM

## 2021-10-18 RX ORDER — LISINOPRIL 20 MG/1
TABLET ORAL
Qty: 180 TABLET | Refills: 0 | Status: SHIPPED | OUTPATIENT
Start: 2021-10-18 | End: 2021-10-20

## 2021-10-18 NOTE — TELEPHONE ENCOUNTER
Last OV 7/7/21  Next OV Visit date not found    Requested Prescriptions     Pending Prescriptions Disp Refills    lisinopril (PRINIVIL;ZESTRIL) 20 MG tablet [Pharmacy Med Name: LISINOPRIL 20 MG TABLET] 180 tablet 0     Sig: TAKE ONE TABLET BY MOUTH TWICE A DAY    last fill 7/19  pended

## 2021-10-20 DIAGNOSIS — I10 ESSENTIAL HYPERTENSION: ICD-10-CM

## 2021-10-20 RX ORDER — LISINOPRIL 20 MG/1
TABLET ORAL
Qty: 180 TABLET | Refills: 0 | Status: SHIPPED | OUTPATIENT
Start: 2021-10-20 | End: 2022-04-25 | Stop reason: SDUPTHER

## 2021-10-20 RX ORDER — HYDROCHLOROTHIAZIDE 12.5 MG/1
CAPSULE, GELATIN COATED ORAL
Qty: 90 CAPSULE | Refills: 3 | Status: SHIPPED | OUTPATIENT
Start: 2021-10-20

## 2021-10-20 NOTE — TELEPHONE ENCOUNTER
Medication:   Requested Prescriptions     Pending Prescriptions Disp Refills    hydroCHLOROthiazide (MICROZIDE) 12.5 MG capsule [Pharmacy Med Name: hydroCHLOROthiazide 12.5 MG CAPSULE] 90 capsule 3     Sig: TAKE ONE CAPSULE BY MOUTH EVERY MORNING    lisinopril (PRINIVIL;ZESTRIL) 20 MG tablet [Pharmacy Med Name: LISINOPRIL 20 MG TABLET] 180 tablet 0     Sig: TAKE ONE TABLET BY MOUTH TWICE A DAY       Last appt: 4/22/2021   Next appt: Visit date not found    Last OARRS: No flowsheet data found.

## 2022-02-09 DIAGNOSIS — I10 ESSENTIAL HYPERTENSION: ICD-10-CM

## 2022-02-09 RX ORDER — AMLODIPINE BESYLATE 10 MG/1
TABLET ORAL
Qty: 90 TABLET | Refills: 1 | Status: SHIPPED | OUTPATIENT
Start: 2022-02-09 | End: 2022-06-09 | Stop reason: SDUPTHER

## 2022-02-09 NOTE — TELEPHONE ENCOUNTER
Medication:   Requested Prescriptions     Pending Prescriptions Disp Refills    amLODIPine (NORVASC) 10 MG tablet 90 tablet 3     Sig: TAKE ONE TABLET BY MOUTH DAILY        Last Filled: 10/11/2021   Last appt: 7/7/2021   Next appt: none

## 2022-02-09 NOTE — TELEPHONE ENCOUNTER
Pt called to say her yearly Amlodipine refills have ended. Please send refills for a year to [facility] on [address]

## 2022-04-18 DIAGNOSIS — F32.A DEPRESSION, UNSPECIFIED DEPRESSION TYPE: ICD-10-CM

## 2022-04-18 NOTE — TELEPHONE ENCOUNTER
Medication:   Requested Prescriptions     Pending Prescriptions Disp Refills    citalopram (CELEXA) 20 MG tablet [Pharmacy Med Name: CITALOPRAM HBR 20 MG TABLET] 90 tablet 3     Sig: TAKE ONE TABLET BY MOUTH DAILY        Last Filled:  05/17/2021    Patient Phone Number: [phone] (home)     Last appt: 7/7/2021   Next appt: Visit date not found    Last OARRS: No flowsheet data found.

## 2022-04-21 RX ORDER — CITALOPRAM 20 MG/1
TABLET ORAL
Qty: 90 TABLET | Refills: 3 | Status: SHIPPED | OUTPATIENT
Start: 2022-04-21

## 2022-04-25 DIAGNOSIS — I10 ESSENTIAL HYPERTENSION: ICD-10-CM

## 2022-04-25 NOTE — TELEPHONE ENCOUNTER
Medication:   Requested Prescriptions     Pending Prescriptions Disp Refills    lisinopril (PRINIVIL;ZESTRIL) 20 MG tablet 180 tablet 0     Sig: Take 1 tablet by mouth in the morning and at bedtime        Last Filled: 10/20/2021   Last appt: 7/7/2021   Next appt: 5/2/2022

## 2022-04-26 RX ORDER — LISINOPRIL 20 MG/1
20 TABLET ORAL 2 TIMES DAILY
Qty: 180 TABLET | Refills: 0 | Status: SHIPPED | OUTPATIENT
Start: 2022-04-26 | End: 2022-07-18

## 2022-05-02 ENCOUNTER — OFFICE VISIT (OUTPATIENT)
Dept: FAMILY MEDICINE CLINIC | Age: 87
End: 2022-05-02
Payer: MEDICARE

## 2022-05-02 VITALS
HEART RATE: 74 BPM | WEIGHT: 136 LBS | BODY MASS INDEX: 25.03 KG/M2 | HEIGHT: 62 IN | DIASTOLIC BLOOD PRESSURE: 82 MMHG | OXYGEN SATURATION: 96 % | SYSTOLIC BLOOD PRESSURE: 138 MMHG

## 2022-05-02 DIAGNOSIS — I10 PRIMARY HYPERTENSION: ICD-10-CM

## 2022-05-02 DIAGNOSIS — Z00.00 MEDICARE ANNUAL WELLNESS VISIT, SUBSEQUENT: Primary | ICD-10-CM

## 2022-05-02 PROCEDURE — G0439 PPPS, SUBSEQ VISIT: HCPCS | Performed by: FAMILY MEDICINE

## 2022-05-02 SDOH — ECONOMIC STABILITY: FOOD INSECURITY: WITHIN THE PAST 12 MONTHS, YOU WORRIED THAT YOUR FOOD WOULD RUN OUT BEFORE YOU GOT MONEY TO BUY MORE.: NEVER TRUE

## 2022-05-02 SDOH — ECONOMIC STABILITY: FOOD INSECURITY: WITHIN THE PAST 12 MONTHS, THE FOOD YOU BOUGHT JUST DIDN'T LAST AND YOU DIDN'T HAVE MONEY TO GET MORE.: NEVER TRUE

## 2022-05-02 ASSESSMENT — PATIENT HEALTH QUESTIONNAIRE - PHQ9
6. FEELING BAD ABOUT YOURSELF - OR THAT YOU ARE A FAILURE OR HAVE LET YOURSELF OR YOUR FAMILY DOWN: 0
5. POOR APPETITE OR OVEREATING: 0
SUM OF ALL RESPONSES TO PHQ9 QUESTIONS 1 & 2: 0
2. FEELING DOWN, DEPRESSED OR HOPELESS: 0
SUM OF ALL RESPONSES TO PHQ QUESTIONS 1-9: 0
9. THOUGHTS THAT YOU WOULD BE BETTER OFF DEAD, OR OF HURTING YOURSELF: 0
1. LITTLE INTEREST OR PLEASURE IN DOING THINGS: 0
3. TROUBLE FALLING OR STAYING ASLEEP: 0
8. MOVING OR SPEAKING SO SLOWLY THAT OTHER PEOPLE COULD HAVE NOTICED. OR THE OPPOSITE, BEING SO FIGETY OR RESTLESS THAT YOU HAVE BEEN MOVING AROUND A LOT MORE THAN USUAL: 0
10. IF YOU CHECKED OFF ANY PROBLEMS, HOW DIFFICULT HAVE THESE PROBLEMS MADE IT FOR YOU TO DO YOUR WORK, TAKE CARE OF THINGS AT HOME, OR GET ALONG WITH OTHER PEOPLE: 0
7. TROUBLE CONCENTRATING ON THINGS, SUCH AS READING THE NEWSPAPER OR WATCHING TELEVISION: 0
4. FEELING TIRED OR HAVING LITTLE ENERGY: 0

## 2022-05-02 ASSESSMENT — LIFESTYLE VARIABLES: HOW OFTEN DO YOU HAVE A DRINK CONTAINING ALCOHOL: NEVER

## 2022-05-02 ASSESSMENT — SOCIAL DETERMINANTS OF HEALTH (SDOH): HOW HARD IS IT FOR YOU TO PAY FOR THE VERY BASICS LIKE FOOD, HOUSING, MEDICAL CARE, AND HEATING?: SOMEWHAT HARD

## 2022-05-02 NOTE — PROGRESS NOTES
Yaya Bachelor (:  1934) is a 80 y.o. female,Established patient, here for evaluation of the following chief complaint(s):  No chief complaint on file. ASSESSMENT/PLAN:  There are no diagnoses linked to this encounter. No follow-ups on file. Subjective   SUBJECTIVE/OBJECTIVE:  HPI Pt is a of 80 y.o. female comes in today with No chief complaint on file. Review of Systems       Objective   Physical Exam         An electronic signature was used to authenticate this note.     --Shonna Crum MD

## 2022-05-02 NOTE — PROGRESS NOTES
Medicare Annual Wellness Visit    Juliet Nuñez is here for Medicare AWV    Assessment & Plan   Medicare annual wellness visit, subsequent  -     Lipid Panel; Future  -     Comprehensive Metabolic Panel; Future  Primary hypertension  -     Lipid Panel; Future  -     Comprehensive Metabolic Panel; Future      Recommendations for Preventive Services Due: see orders and patient instructions/AVS.  Recommended screening schedule for the next 5-10 years is provided to the patient in written form: see Patient Instructions/AVS.     Return for Medicare Annual Wellness Visit in 1 year. Subjective       Patient's complete Health Risk Assessment and screening values have been reviewed and are found in Flowsheets. The following problems were reviewed today and where indicated follow up appointments were made and/or referrals ordered.     Positive Risk Factor Screenings with Interventions:    Fall Risk:  Do you feel unsteady or are you worried about falling? : (!) yes  2 or more falls in past year?: (!) yes  Fall with injury in past year?: no     Fall Risk Interventions:    · Home safety tips provided            General Health and ACP:  General  In general, how would you say your health is?: Very Good  In the past 7 days, have you experienced any of the following: New or Increased Pain, New or Increased Fatigue, Loneliness, Social Isolation, Stress or Anger?: No  Do you get the social and emotional support that you need?: Yes  Do you have a Living Will?: Yes    Advance Directives     Power of  Living Will ACP-Advance Directive ACP-Power of     Not on File Not on File Not on File Not on File      General Health Risk Interventions:  · will get on file    Health Habits/Nutrition:     Physical Activity: Inactive    Days of Exercise per Week: 0 days    Minutes of Exercise per Session: 0 min     Have you lost any weight without trying in the past 3 months?: No  Body mass index: 24.87  Have you seen the dentist within the past year?: Appointment is scheduled    Health Habits/Nutrition Interventions:  · Inadequate physical activity:  will start italo chi    Hearing/Vision:  Do you or your family notice any trouble with your hearing that hasn't been managed with hearing aids?: (!) Yes  Do you have difficulty driving, watching TV, or doing any of your daily activities because of your eyesight?: No  Have you had an eye exam within the past year?: Yes  No exam data present    Hearing/Vision Interventions:  · Hearing concerns:  patient declines any further evaluation/treatment for hearing issues    Safety:  Do you have working smoke detectors?: Yes  Do you have any tripping hazards - loose or unsecured carpets or rugs?: (!) Yes  Do you have any tripping hazards - clutter in doorways, halls, or stairs?: No  Do you have either shower bars, grab bars, non-slip mats or non-slip surfaces in your shower or bathtub?: Yes  Do all of your stairways have a railing or banister?: Not Applicable  Do you always fasten your seatbelt when you are in a car?: Yes    Safety Interventions:  · Home safety tips provided           Objective   Vitals:    05/02/22 1429   BP: 138/82   Site: Right Upper Arm   Position: Sitting   Cuff Size: Small Adult   Pulse: 74   SpO2: 96%   Weight: 136 lb (61.7 kg)   Height: 5' 2\" (1.575 m)      Body mass index is 24.87 kg/m². General Appearance: alert and oriented to person, place and time, well-developed and well-nourished, in no acute distress  Pulmonary/Chest: clear to auscultation bilaterally- no wheezes, rales or rhonchi, normal air movement, no respiratory distress  Cardiovascular: normal rate, normal S1 and S2, no gallops, intact distal pulses and no carotid bruits       Allergies   Allergen Reactions    Codeine Nausea And Vomiting    Sulfa Antibiotics Nausea And Vomiting     Prior to Visit Medications    Medication Sig Taking?  Authorizing Provider   lisinopril (PRINIVIL;ZESTRIL) 20 MG tablet Take 1 tablet by mouth in the morning and at bedtime Yes Lori Blue MD   citalopram (CELEXA) 20 MG tablet TAKE ONE TABLET BY MOUTH DAILY Yes Lori Blue MD   amLODIPine (NORVASC) 10 MG tablet TAKE ONE TABLET BY MOUTH DAILY Yes Lori Blue MD   hydroCHLOROthiazide (MICROZIDE) 12.5 MG capsule TAKE ONE CAPSULE BY MOUTH EVERY MORNING Yes Lori Blue MD   Docusate Calcium (STOOL SOFTENER PO) Take by mouth Yes Historical Provider, MD   vitamin D (CHOLECALCIFEROL) 1000 units TABS tablet Take 1,000 Units by mouth daily Yes Historical Provider, MD   Naproxen Sodium (ALEVE PO) Take by mouth 2 times daily Yes Historical Provider, MD   Bioflavonoid Products (BIOFLEX PO) Take by mouth daily Yes Historical Provider, MD   DORZOLAMIDE HCL-TIMOLOL MAL OP Apply to eye 1 drop every 12 hours Yes Historical Provider, MD   Flaxseed, Linseed, (FLAXSEED OIL) 1000 MG CAPS Take  by mouth. Yes Historical Provider, MD   Multiple Vitamins-Minerals (THERAPEUTIC MULTIVITAMIN-MINERALS) tablet Take 1 tablet by mouth daily.  Yes Historical Provider, MD   Ascorbic Acid (VITAMIN C) 500 MG tablet Take 1,000 mg by mouth daily  Yes Historical Provider, MD       CareTeam (Including outside providers/suppliers regularly involved in providing care):   Patient Care Team:  Lori Blue MD as PCP - General (Family Medicine)  Lori Blue MD as PCP - [facility] Empaneled Provider    Reviewed and updated this visit:  Tobacco  Allergies  Meds  Med Hx  Surg Hx  Soc Hx  Fam Hx

## 2022-05-02 NOTE — PATIENT INSTRUCTIONS
Personalized Preventive Plan for Juliet Nuñez - 5/2/2022  Medicare offers a range of preventive health benefits. Some of the tests and screenings are paid in full while other may be subject to a deductible, co-insurance, and/or copay. Some of these benefits include a comprehensive review of your medical history including lifestyle, illnesses that may run in your family, and various assessments and screenings as appropriate. After reviewing your medical record and screening and assessments performed today your provider may have ordered immunizations, labs, imaging, and/or referrals for you. A list of these orders (if applicable) as well as your Preventive Care list are included within your After Visit Summary for your review. Other Preventive Recommendations:    · A preventive eye exam performed by an eye specialist is recommended every 1-2 years to screen for glaucoma; cataracts, macular degeneration, and other eye disorders. · A preventive dental visit is recommended every 6 months. · Try to get at least 150 minutes of exercise per week or 10,000 steps per day on a pedometer . · Order or download the FREE \"Exercise & Physical Activity: Your Everyday Guide\" from [employer]. Call 4-152.452.8099 or search [employer] online. · You need 6130-0616 mg of calcium and 9185-1495 IU of vitamin D per day. It is possible to meet your calcium requirement with diet alone, but a vitamin D supplement is usually necessary to meet this goal.  · When exposed to the sun, use a sunscreen that protects against both UVA and UVB radiation with an SPF of 30 or greater. Reapply every 2 to 3 hours or after sweating, drying off with a towel, or swimming. · Always wear a seat belt when traveling in a car. Always wear a helmet when riding a bicycle or motorcycle.

## 2022-05-09 DIAGNOSIS — Z00.00 MEDICARE ANNUAL WELLNESS VISIT, SUBSEQUENT: ICD-10-CM

## 2022-05-09 DIAGNOSIS — I10 PRIMARY HYPERTENSION: ICD-10-CM

## 2022-05-09 LAB
A/G RATIO: 2.1 (ref 1.1–2.2)
ALBUMIN SERPL-MCNC: 4.5 G/DL (ref 3.4–5)
ALP BLD-CCNC: 58 U/L (ref 40–129)
ALT SERPL-CCNC: 10 U/L (ref 10–40)
ANION GAP SERPL CALCULATED.3IONS-SCNC: 12 MMOL/L (ref 3–16)
AST SERPL-CCNC: 22 U/L (ref 15–37)
BILIRUB SERPL-MCNC: 0.4 MG/DL (ref 0–1)
BUN BLDV-MCNC: 25 MG/DL (ref 7–20)
CALCIUM SERPL-MCNC: 10.2 MG/DL (ref 8.3–10.6)
CHLORIDE BLD-SCNC: 102 MMOL/L (ref 99–110)
CHOLESTEROL, TOTAL: 166 MG/DL (ref 0–199)
CO2: 26 MMOL/L (ref 21–32)
CREAT SERPL-MCNC: 0.8 MG/DL (ref 0.6–1.2)
GFR AFRICAN AMERICAN: >60
GFR NON-AFRICAN AMERICAN: >60
GLUCOSE BLD-MCNC: 94 MG/DL (ref 70–99)
HDLC SERPL-MCNC: 52 MG/DL (ref 40–60)
LDL CHOLESTEROL CALCULATED: 102 MG/DL
POTASSIUM SERPL-SCNC: 4.4 MMOL/L (ref 3.5–5.1)
SODIUM BLD-SCNC: 140 MMOL/L (ref 136–145)
TOTAL PROTEIN: 6.6 G/DL (ref 6.4–8.2)
TRIGL SERPL-MCNC: 59 MG/DL (ref 0–150)
VLDLC SERPL CALC-MCNC: 12 MG/DL

## 2022-05-16 ENCOUNTER — TELEPHONE (OUTPATIENT)
Dept: FAMILY MEDICINE CLINIC | Age: 87
End: 2022-05-16

## 2022-06-08 DIAGNOSIS — I10 ESSENTIAL HYPERTENSION: ICD-10-CM

## 2022-06-08 NOTE — TELEPHONE ENCOUNTER
Medication:   Requested Prescriptions     Pending Prescriptions Disp Refills    amLODIPine (NORVASC) 10 MG tablet 90 tablet 1     Sig: TAKE ONE TABLET BY MOUTH DAILY        Last Filled: 2/9/2022   Last appt: 5/2/2022   Next appt: none

## 2022-06-08 NOTE — TELEPHONE ENCOUNTER
Pt called requesting refill of amlodipine 10mg.  Please send to [address] in [facility] on [facility] - [address]

## 2022-06-09 RX ORDER — AMLODIPINE BESYLATE 10 MG/1
TABLET ORAL
Qty: 90 TABLET | Refills: 1 | Status: SHIPPED | OUTPATIENT
Start: 2022-06-09

## 2022-07-18 DIAGNOSIS — I10 ESSENTIAL HYPERTENSION: ICD-10-CM

## 2022-07-18 RX ORDER — LISINOPRIL 20 MG/1
TABLET ORAL
Qty: 180 TABLET | Refills: 0 | Status: SHIPPED | OUTPATIENT
Start: 2022-07-18

## 2022-07-18 NOTE — TELEPHONE ENCOUNTER
Medication:   Requested Prescriptions     Pending Prescriptions Disp Refills    lisinopril (PRINIVIL;ZESTRIL) 20 MG tablet [Pharmacy Med Name: LISINOPRIL 20 MG TABLET] 180 tablet 0     Sig: TAKE ONE TABLET BY MOUTH EVERY MORNING AND TAKE ONE TABLET BY MOUTH EVERY NIGHT AT BEDTIME        Last Filled: 4/26/2022   Last appt: 5/2/2022   Next appt:NONE

## 2022-08-25 ENCOUNTER — OFFICE VISIT (OUTPATIENT)
Dept: FAMILY MEDICINE CLINIC | Age: 87
End: 2022-08-25
Payer: MEDICARE

## 2022-08-25 VITALS
WEIGHT: 129 LBS | DIASTOLIC BLOOD PRESSURE: 80 MMHG | SYSTOLIC BLOOD PRESSURE: 138 MMHG | TEMPERATURE: 97.1 F | HEIGHT: 62 IN | OXYGEN SATURATION: 98 % | HEART RATE: 67 BPM | BODY MASS INDEX: 23.74 KG/M2

## 2022-08-25 DIAGNOSIS — N93.9 VAGINA BLEEDING: Primary | ICD-10-CM

## 2022-08-25 DIAGNOSIS — Z91.81 AT HIGH RISK FOR FALLS: ICD-10-CM

## 2022-08-25 PROCEDURE — 1123F ACP DISCUSS/DSCN MKR DOCD: CPT | Performed by: NURSE PRACTITIONER

## 2022-08-25 PROCEDURE — 99213 OFFICE O/P EST LOW 20 MIN: CPT | Performed by: NURSE PRACTITIONER

## 2022-08-25 ASSESSMENT — ENCOUNTER SYMPTOMS
SINUS PAIN: 0
SORE THROAT: 0
BLOOD IN STOOL: 0
EYE DISCHARGE: 0
PHOTOPHOBIA: 0
RHINORRHEA: 0
EYE ITCHING: 0
CHOKING: 0
VOICE CHANGE: 0
CHEST TIGHTNESS: 0
SINUS PRESSURE: 0
VOMITING: 0
ABDOMINAL PAIN: 0
DIARRHEA: 0
CONSTIPATION: 0
WHEEZING: 0
COUGH: 0
EYE PAIN: 0
SHORTNESS OF BREATH: 0
COLOR CHANGE: 0
EYE REDNESS: 0
BACK PAIN: 0
TROUBLE SWALLOWING: 0
STRIDOR: 0
NAUSEA: 0

## 2022-08-25 NOTE — PROGRESS NOTES
Chief Complaint   Patient presents with    Vaginal Bleeding       /80 (Site: Right Upper Arm, Position: Sitting, Cuff Size: Medium Adult)   Pulse 67   Temp 97.1 °F (36.2 °C)   Ht 5' 2\" (1.575 m)   Wt 129 lb (58.5 kg)   SpO2 98%   BMI 23.59 kg/m²     HPI:  Audra Lopez is a 80 y.o. (: 1934) here today   for   HPI    Patient's medications, allergies, past medical, surgical, social and family histories were reviewed and updated as appropriate. Bleeding from vagina: intermittent blood from vagina, no blood on stool, stool brown, no blood in urine, more with BM. Has not had colonoscopy recently. Denies light headed , denies palpitations. ROS:  Review of Systems   Constitutional:  Negative for activity change, appetite change, chills, diaphoresis, fatigue, fever and unexpected weight change. HENT:  Negative for congestion, ear discharge, ear pain, hearing loss, nosebleeds, postnasal drip, rhinorrhea, sinus pressure, sinus pain, sneezing, sore throat, tinnitus, trouble swallowing and voice change. Eyes:  Negative for photophobia, pain, discharge, redness and itching. Respiratory:  Negative for cough, choking, chest tightness, shortness of breath, wheezing and stridor. Cardiovascular:  Negative for chest pain, palpitations and leg swelling. Gastrointestinal:  Negative for abdominal pain, blood in stool, constipation, diarrhea, nausea and vomiting. Endocrine: Negative for cold intolerance, heat intolerance, polydipsia and polyuria. Genitourinary:  Positive for vaginal bleeding. Negative for difficulty urinating, dysuria, enuresis, flank pain, frequency, hematuria and urgency. Musculoskeletal:  Negative for back pain, gait problem, joint swelling, neck pain and neck stiffness. Skin:  Negative for color change, pallor, rash and wound. Allergic/Immunologic: Negative for environmental allergies and food allergies.    Neurological:  Negative for dizziness, tremors, syncope, speech difficulty, weakness, light-headedness, numbness and headaches. Hematological:  Negative for adenopathy. Does not bruise/bleed easily. Psychiatric/Behavioral:  Negative for agitation, behavioral problems, confusion, decreased concentration, dysphoric mood, hallucinations, self-injury, sleep disturbance and suicidal ideas. The patient is not nervous/anxious and is not hyperactive. Prior to Visit Medications    Medication Sig Taking? Authorizing Provider   Probiotic Product (PROBIOTIC-10 PO) Take by mouth Yes Historical Provider, MD   lisinopril (PRINIVIL;ZESTRIL) 20 MG tablet TAKE ONE TABLET BY MOUTH EVERY MORNING AND TAKE ONE TABLET BY MOUTH EVERY NIGHT AT BEDTIME Yes Mag Dk, APRN - CNP   amLODIPine (NORVASC) 10 MG tablet TAKE ONE TABLET BY MOUTH DAILY Yes [place]o [place] MD   citalopram (CELEXA) 20 MG tablet TAKE ONE TABLET BY MOUTH DAILY Yes Glo [place] MD   hydroCHLOROthiazide (MICROZIDE) 12.5 MG capsule TAKE ONE CAPSULE BY MOUTH EVERY MORNING Yes Glo [place] MD   Docusate Calcium (STOOL SOFTENER PO) Take by mouth Yes Historical Provider, MD   vitamin D (CHOLECALCIFEROL) 1000 units TABS tablet Take 1,000 Units by mouth daily Yes Historical Provider, MD   Naproxen Sodium (ALEVE PO) Take by mouth 2 times daily Yes Historical Provider, MD   Bioflavonoid Products (BIOFLEX PO) Take by mouth daily Yes Historical Provider, MD   DORZOLAMIDE HCL-TIMOLOL MAL OP Apply to eye 1 drop every 12 hours Yes Historical Provider, MD   Flaxseed, Linseed, (FLAXSEED OIL) 1000 MG CAPS Take  by mouth. Yes Historical Provider, MD   Multiple Vitamins-Minerals (THERAPEUTIC MULTIVITAMIN-MINERALS) tablet Take 1 tablet by mouth daily.  Yes Historical Provider, MD   Ascorbic Acid (VITAMIN C) 500 MG tablet Take 1,000 mg by mouth daily  Yes Historical Provider, MD       Allergies   Allergen Reactions    Codeine Nausea And Vomiting    Sulfa Antibiotics Nausea And Vomiting       OBJECTIVE:      BP Readings from Last 2 Encounters:   08/25/22 138/80   05/02/22 138/82       Wt Readings from Last 3 Encounters:   08/25/22 129 lb (58.5 kg)   05/02/22 136 lb (61.7 kg)   07/07/21 135 lb (61.2 kg)       Physical Exam  Vitals reviewed. Constitutional:       General: She is not in acute distress. Appearance: Normal appearance. She is well-developed. HENT:      Head: Normocephalic and atraumatic. Right Ear: Hearing and external ear normal.      Left Ear: Hearing and external ear normal.      Nose: Nose normal.      Right Sinus: No maxillary sinus tenderness or frontal sinus tenderness. Left Sinus: No maxillary sinus tenderness or frontal sinus tenderness. Eyes:      General:         Right eye: No discharge. Left eye: No discharge. Conjunctiva/sclera: Conjunctivae normal.   Neck:      Thyroid: No thyromegaly. Vascular: No JVD. Trachea: No tracheal deviation. Cardiovascular:      Rate and Rhythm: Normal rate and regular rhythm. Heart sounds: Normal heart sounds. No murmur heard. No friction rub. Pulmonary:      Effort: Pulmonary effort is normal. No respiratory distress. Breath sounds: Normal breath sounds. No stridor. No decreased breath sounds, wheezing, rhonchi or rales. Musculoskeletal:         General: No tenderness. Normal range of motion. Cervical back: Normal range of motion. Lymphadenopathy:      Cervical: No cervical adenopathy. Skin:     General: Skin is warm and dry. Capillary Refill: Capillary refill takes less than 2 seconds. Findings: No rash. Neurological:      Mental Status: She is alert and oriented to person, place, and time. Sensory: Sensation is intact. Motor: Motor function is intact.       Coordination: Coordination normal.   Psychiatric:         Attention and Perception: Attention and perception normal.         Mood and Affect: Mood normal.         Speech: Speech normal.         Behavior: Behavior normal. Behavior is cooperative. Thought Content: Thought content normal.         Cognition and Memory: Cognition normal.         Judgment: Judgment normal.       ASSESSMENT/PLAN:    1. Vagina bleeding    - Brad Soto MD, Gynecology, [facility]  - CBC with Auto Differential; Future  - BLOOD OCCULT STOOL SCREEN #2; Future  - BLOOD OCCULT STOOL SCREEN #3; Future  - BLOOD OCCULT STOOL SCREEN #1; Future    2. At high risk for falls    - Brad Soto MD, Gynecology, [facility]  - CBC with Auto Differential; Future  - BLOOD OCCULT STOOL SCREEN #2; Future  - BLOOD OCCULT STOOL SCREEN #3; Future  - BLOOD OCCULT STOOL SCREEN #1; Future    Will follow up with labs. Will call to get her appt at gyn. Follow up if symptoms do not improve or worsen. If the patient becomes short of breath go straight to the ER or call 911.

## 2022-08-26 DIAGNOSIS — N93.9 VAGINA BLEEDING: ICD-10-CM

## 2022-08-26 DIAGNOSIS — Z91.81 AT HIGH RISK FOR FALLS: ICD-10-CM

## 2022-08-26 LAB
BASOPHILS ABSOLUTE: 0.1 K/UL (ref 0–0.2)
BASOPHILS RELATIVE PERCENT: 1.3 %
EOSINOPHILS ABSOLUTE: 0.4 K/UL (ref 0–0.6)
EOSINOPHILS RELATIVE PERCENT: 5.3 %
HCT VFR BLD CALC: 42.2 % (ref 36–48)
HEMOGLOBIN: 14.3 G/DL (ref 12–16)
LYMPHOCYTES ABSOLUTE: 2.4 K/UL (ref 1–5.1)
LYMPHOCYTES RELATIVE PERCENT: 34.1 %
MCH RBC QN AUTO: 34.6 PG (ref 26–34)
MCHC RBC AUTO-ENTMCNC: 33.9 G/DL (ref 31–36)
MCV RBC AUTO: 102 FL (ref 80–100)
MONOCYTES ABSOLUTE: 0.7 K/UL (ref 0–1.3)
MONOCYTES RELATIVE PERCENT: 10 %
NEUTROPHILS ABSOLUTE: 3.4 K/UL (ref 1.7–7.7)
NEUTROPHILS RELATIVE PERCENT: 49.3 %
PDW BLD-RTO: 13.9 % (ref 12.4–15.4)
PLATELET # BLD: 344 K/UL (ref 135–450)
PMV BLD AUTO: 8.3 FL (ref 5–10.5)
RBC # BLD: 4.14 M/UL (ref 4–5.2)
WBC # BLD: 7 K/UL (ref 4–11)

## 2022-08-29 ENCOUNTER — TELEPHONE (OUTPATIENT)
Dept: FAMILY MEDICINE CLINIC | Age: 87
End: 2022-08-29

## 2022-08-29 DIAGNOSIS — Z91.81 AT HIGH RISK FOR FALLS: ICD-10-CM

## 2022-08-29 DIAGNOSIS — N93.9 VAGINA BLEEDING: ICD-10-CM

## 2022-08-29 LAB
HEMOCCULT SP2 STL QL: NORMAL
HEMOCCULT SP3 STL QL: NORMAL
OCCULT BLOOD SCREENING: NORMAL

## 2022-08-29 NOTE — TELEPHONE ENCOUNTER
Pt rc. Lab result notes from Nurse Hylton read to pt. Gynecologist referral info given to pt, along with their scheduling phone number.

## 2022-09-02 ENCOUNTER — OFFICE VISIT (OUTPATIENT)
Dept: GYNECOLOGY | Age: 87
End: 2022-09-02
Payer: MEDICARE

## 2022-09-02 VITALS — DIASTOLIC BLOOD PRESSURE: 82 MMHG | OXYGEN SATURATION: 98 % | HEART RATE: 70 BPM | SYSTOLIC BLOOD PRESSURE: 132 MMHG

## 2022-09-02 DIAGNOSIS — N93.9 VAGINA BLEEDING: Primary | ICD-10-CM

## 2022-09-02 PROCEDURE — 99203 OFFICE O/P NEW LOW 30 MIN: CPT | Performed by: OBSTETRICS & GYNECOLOGY

## 2022-09-02 PROCEDURE — 1123F ACP DISCUSS/DSCN MKR DOCD: CPT | Performed by: OBSTETRICS & GYNECOLOGY

## 2022-09-02 NOTE — LETTER
[address] [facility]  [place] [address] [place] [address]  Phone: [phone]  Fax: [phone]           Filemon Sanchez MD      September 2, 2022     Patient: Barbara Alatorre   MR Number: [id number]   YOB: 1934   Date of Visit: 9/2/2022       Dear Dr. Hylton: Thank you for referring Miracle Christopher to me for evaluation/treatment. Below are the relevant portions of my assessment and plan of care. As you know, Melida Saldivar experienced some bleeding. Back in 2004 she had a full hysterectomy. On exam, she was found to have normal external genitalia and vagina. There is no evidence of neoplastic process involving the vulva or the vagina. She had a very small hemorrhoidal tag and through your office, Hemoccult cards were negative. She is not up-to-date on her colon cancer screening and this can be considered in the future for her however in the meanwhile, I think it would be reasonable to be evaluated by urology with possible cystoscopy. A referral was placed and instructions were given to her. If you have questions, please do not hesitate to call me. I look forward to following Cj Marquez along with you.     Sincerely,        Filemon Sanchez MD    CC providers:  Linden Alvarez MD  [address] [place] [id number]  [facility], APRN - CNP  [address] [address]  [facility] [employer]

## 2022-09-02 NOTE — PROGRESS NOTES
Chief complaint: Vaginal Bleeding (States it has stopped now but still looks irritated. Bleed for 2 weeks - has been having it off and on for a few months. )      History of present illness: June Patterson 80 y.o. female No LMP recorded. Patient has had a hysterectomy. Who presents with complaint of bleeding. The patient reports that over the last 2 months she would intermittently notice some bleeding. She would notice this because it would be in her depends pad. She reports that she was having some hip and knee issues and started having to wear some depends pads. She states that sometimes when the pads would get soiled she would place some baby powder in the pad. Since doing this, she would occasionally notice some blood. She states that most recently she had 2 to 3 days when she would have bright red blood in her pad and sometimes she would have a dark clot. She does not think this is coming from her urine noting that she can void and have yellow urine. She does feel that she will have to void a few times before finally getting up off of the toilet. She does not think that this is from her bowel noting that she will have normal soft bowel movements without blood or melanin. She denies any pain. No vaginal pain. No pain with urination or bowel movement. She is not able to note a precipitating event to cause the bleeding except that this coincided with her using baby powder in her depends pads. In 2004 she had a total hysterectomy and bilateral salpingo-oophorectomy performed. Patient Active Problem List   Diagnosis    Hypertension    Osteoarthritis    Depression    Glaucoma    Osteopenia of multiple sites     Review of systems:  No complaints of symptoms involving:  Constitutional: negative for fever, chills. Eyes: No change in vision, double vision, or scotomata. HENT: No sore throat, ear pain or nasal congestion. Respiratory: No cough, shortness of breath or hemoptysis.   Cardiovascular: No

## 2022-09-06 ENCOUNTER — TELEPHONE (OUTPATIENT)
Dept: FAMILY MEDICINE CLINIC | Age: 87
End: 2022-09-06

## 2022-09-06 NOTE — TELEPHONE ENCOUNTER
Called pt and number went straight to voicemail. Patient was cleared by gynecology based on her having hysterectomy. Upon his exam did not see anything as the cause of her bleeding. Due to the occult stool cards being negative GI system essentially ruled out at this time. Advised to follow-up with a urologist to rule out that as a cause of the bleeding. I can place a more specific referral for the patient if she would like.   Appears the gynecologist place a general urology referral.

## 2022-09-06 NOTE — TELEPHONE ENCOUNTER
Patient is calling in and would like to speak to Kash Alcazar about her appointment with the Gynecologist that she was referred too. As well as the provider that the Gynecologist referred her too. That provider referred her to a Urologist. The patient does not know the Dr's name or the group that she was referred too. She just knows that they have multipule locations around the area and that is all the information she could provide me. Please give the patient a call at [phone].

## 2022-10-26 ENCOUNTER — OFFICE VISIT (OUTPATIENT)
Dept: FAMILY MEDICINE CLINIC | Age: 87
End: 2022-10-26
Payer: MEDICARE

## 2022-10-26 VITALS
OXYGEN SATURATION: 95 % | HEIGHT: 62 IN | DIASTOLIC BLOOD PRESSURE: 80 MMHG | HEART RATE: 64 BPM | BODY MASS INDEX: 24.92 KG/M2 | WEIGHT: 135.4 LBS | SYSTOLIC BLOOD PRESSURE: 142 MMHG

## 2022-10-26 DIAGNOSIS — Z01.818 PRE-OP EXAM: Primary | ICD-10-CM

## 2022-10-26 DIAGNOSIS — I10 PRIMARY HYPERTENSION: ICD-10-CM

## 2022-10-26 DIAGNOSIS — R31.0 GROSS HEMATURIA: ICD-10-CM

## 2022-10-26 PROCEDURE — 93000 ELECTROCARDIOGRAM COMPLETE: CPT | Performed by: FAMILY MEDICINE

## 2022-10-26 PROCEDURE — 1123F ACP DISCUSS/DSCN MKR DOCD: CPT | Performed by: FAMILY MEDICINE

## 2022-10-26 PROCEDURE — 99213 OFFICE O/P EST LOW 20 MIN: CPT | Performed by: FAMILY MEDICINE

## 2022-10-26 ASSESSMENT — ENCOUNTER SYMPTOMS: RESPIRATORY NEGATIVE: 1

## 2022-10-26 NOTE — PROGRESS NOTES
Renny Irizarry (:  1934) is a 80 y.o. female,Established patient, here for evaluation of the following chief complaint(s):  Pre-op Exam (Patient is scheduled for a cystoscopy& urogram on 22 at [facility] with Dr. Kandi Cranker. )         ASSESSMENT/PLAN:  Augustina Lynn was seen today for pre-op exam.    Diagnoses and all orders for this visit:    Pre-op exam  -     EKG 12 Lead - Clinic Performed  Medically patient is at acceptable risk to undergo planned surgery. Primary hypertension  Stable on lisinopril, amlodipine, hctz  Gross hematuria  workup neg so far. Scheduled for cystoscopy    No follow-ups on file. Subjective   SUBJECTIVE/OBJECTIVE:  HPI  Pt is a of 80 y.o. female comes in today with   Chief Complaint   Patient presents with    Pre-op Exam     Patient is scheduled for a cystoscopy& urogram on 22 at [facility] with Dr. Kandi Cranker. No personal or family history of adverse reaction to anesthesia or bleeding tendency.    No change in exercise tolerance  Vitals:    10/26/22 1431   BP: (!) 142/80   Site: Left Upper Arm   Position: Sitting   Cuff Size: Small Adult   Pulse: 64   SpO2: 95%   Weight: 135 lb 6.4 oz (61.4 kg)   Height: 5' 2\" (1.575 m)     Allergies   Allergen Reactions    Codeine Nausea And Vomiting    Sulfa Antibiotics Nausea And Vomiting       Current Outpatient Medications on File Prior to Visit   Medication Sig Dispense Refill    Probiotic Product (PROBIOTIC-10 PO) Take by mouth      lisinopril (PRINIVIL;ZESTRIL) 20 MG tablet TAKE ONE TABLET BY MOUTH EVERY MORNING AND TAKE ONE TABLET BY MOUTH EVERY NIGHT AT BEDTIME 180 tablet 0    amLODIPine (NORVASC) 10 MG tablet TAKE ONE TABLET BY MOUTH DAILY 90 tablet 1    citalopram (CELEXA) 20 MG tablet TAKE ONE TABLET BY MOUTH DAILY 90 tablet 3    hydroCHLOROthiazide (MICROZIDE) 12.5 MG capsule TAKE ONE CAPSULE BY MOUTH EVERY MORNING 90 capsule 3    Docusate Calcium (STOOL SOFTENER PO) Take by mouth      vitamin D (CHOLECALCIFEROL) 1000 units TABS tablet Take 1,000 Units by mouth daily      Naproxen Sodium (ALEVE PO) Take by mouth 2 times daily      Bioflavonoid Products (BIOFLEX PO) Take by mouth daily      DORZOLAMIDE HCL-TIMOLOL MAL OP Apply to eye 1 drop every 12 hours      Flaxseed, Linseed, (FLAXSEED OIL) 1000 MG CAPS Take  by mouth. Multiple Vitamins-Minerals (THERAPEUTIC MULTIVITAMIN-MINERALS) tablet Take 1 tablet by mouth daily. Ascorbic Acid (VITAMIN C) 500 MG tablet Take 1,000 mg by mouth daily        No current facility-administered medications on file prior to visit. Past Medical History:   Diagnosis Date    Bradycardia     Cerumen impaction     Depression     Glaucoma     Hypertension     Osteoarthritis        Past Surgical History:   Procedure Laterality Date    APPENDECTOMY      BREAST LUMPECTOMY      HYSTERECTOMY (CERVIX STATUS UNKNOWN)      KNEE SURGERY Left        Social History     Socioeconomic History    Marital status:      Spouse name: None    Number of children: None    Years of education: None    Highest education level: None   Tobacco Use    Smoking status: Never    Smokeless tobacco: Never   Substance and Sexual Activity    Alcohol use: No     Alcohol/week: 0.0 standard drinks    Sexual activity: Not Currently     Partners: Male   Social History Narrative    Remarried 3/2013 3 years after previous  . Was living at [facility] but moved out 2013 as new  did not like it. Met  as his daughter and her daughter were long time friends. 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       Social Determinants of Health     Financial Resource Strain: Medium Risk    Difficulty of Paying Living Expenses: Somewhat hard   Food Insecurity: No Food Insecurity    Worried About Running Out of Food in the Last Year: Never true    Ran Out of Food in the Last Year: Never true   Physical Activity: Inactive    Days of Exercise per Week: 0 days    Minutes of Exercise per Session: 0 min       Social History     Substance and Sexual Activity   Drug Use Not on file       Family History   Problem Relation Age of Onset    Stroke Father     High Blood Pressure Father     Stroke Brother     High Blood Pressure Brother     Alzheimer's Disease Mother     High Blood Pressure Mother     Diabetes Mother         Review of Systems   Constitutional: Negative. Respiratory: Negative. Cardiovascular: Negative. Hematological:  Does not bruise/bleed easily. Objective   Physical Exam  Constitutional:       Appearance: Normal appearance. HENT:      Mouth/Throat:      Mouth: Mucous membranes are moist.      Pharynx: Oropharynx is clear. Cardiovascular:      Rate and Rhythm: Normal rate and regular rhythm. Heart sounds: No murmur heard. Pulmonary:      Effort: Pulmonary effort is normal.      Breath sounds: Normal breath sounds. Musculoskeletal:      Cervical back: Normal range of motion and neck supple. Lymphadenopathy:      Cervical: No cervical adenopathy. Skin:     General: Skin is warm and dry. Capillary Refill: Capillary refill takes less than 2 seconds. Neurological:      General: No focal deficit present. Mental Status: She is alert. An electronic signature was used to authenticate this note.     --Zeyad Hallman MD

## 2022-11-07 DIAGNOSIS — I10 ESSENTIAL HYPERTENSION: ICD-10-CM

## 2022-11-07 RX ORDER — LISINOPRIL 20 MG/1
TABLET ORAL
Qty: 180 TABLET | Refills: 0 | Status: SHIPPED | OUTPATIENT
Start: 2022-11-07

## 2022-11-17 LAB
BUN / CREAT RATIO: 18.9 RATIO (ref 9–28)
BUN BLDV-MCNC: 17 MG/DL (ref 6–20)
CREAT SERPL-MCNC: 0.9 MG/DL (ref 0.6–1)

## 2022-12-05 DIAGNOSIS — I10 ESSENTIAL HYPERTENSION: ICD-10-CM

## 2022-12-05 NOTE — TELEPHONE ENCOUNTER
Medication:   Requested Prescriptions     Pending Prescriptions Disp Refills    amLODIPine (NORVASC) 10 MG tablet 90 tablet 1     Sig: TAKE ONE TABLET BY MOUTH DAILY    hydroCHLOROthiazide (MICROZIDE) 12.5 MG capsule 90 capsule 3        Last Filled:  Norvasc 6/9/22 Microzide 10/20/21    Patient Phone Number: [phone] (home)     Last appt: 10/26/2022   Next appt: Visit date not found    Last OARRS: No flowsheet data found.

## 2022-12-05 NOTE — TELEPHONE ENCOUNTER
Amlodipine 10 mg  Hydrochlorothiazide 12.5 mg  [facility] [address] [place] [address], S..  90 day supply requested with 3 refills

## 2022-12-06 RX ORDER — HYDROCHLOROTHIAZIDE 12.5 MG/1
CAPSULE, GELATIN COATED ORAL
Qty: 90 CAPSULE | Refills: 3 | Status: SHIPPED | OUTPATIENT
Start: 2022-12-06

## 2022-12-06 RX ORDER — AMLODIPINE BESYLATE 10 MG/1
TABLET ORAL
Qty: 90 TABLET | Refills: 1 | Status: SHIPPED | OUTPATIENT
Start: 2022-12-06

## 2022-12-12 DIAGNOSIS — I10 ESSENTIAL HYPERTENSION: ICD-10-CM

## 2022-12-12 RX ORDER — LISINOPRIL 20 MG/1
TABLET ORAL
Qty: 180 TABLET | Refills: 0 | Status: SHIPPED | OUTPATIENT
Start: 2022-12-12

## 2022-12-12 NOTE — TELEPHONE ENCOUNTER
Medication:   Requested Prescriptions     Pending Prescriptions Disp Refills    lisinopril (PRINIVIL;ZESTRIL) 20 MG tablet [Pharmacy Med Name: LISINOPRIL 20 MG TABLET] 180 tablet 0     Sig: TAKE ONE TABLET BY MOUTH EVERY MORNING AND TAKE ONE TABLET BY MOUTH EVERY NIGHT AT BEDTIME        Last Filled:  11/7/22    Patient Phone Number: [phone] (home)     Last appt: 10/26/2022   Next appt: Visit date not found    Last OARRS: No flowsheet data found.

## 2023-03-27 DIAGNOSIS — F32.A DEPRESSION, UNSPECIFIED DEPRESSION TYPE: ICD-10-CM

## 2023-03-27 RX ORDER — CITALOPRAM 20 MG/1
TABLET ORAL
Qty: 90 TABLET | Refills: 3 | Status: SHIPPED | OUTPATIENT
Start: 2023-03-27

## 2023-03-27 NOTE — TELEPHONE ENCOUNTER
Medication:   Requested Prescriptions     Pending Prescriptions Disp Refills    citalopram (CELEXA) 20 MG tablet [Pharmacy Med Name: CITALOPRAM HBR 20 MG TABLET] 90 tablet 3     Sig: TAKE ONE TABLET BY MOUTH DAILY        Last Filled:  4/21/2022  Last appt: 10/26/2022   Next appt: none

## 2023-05-22 ENCOUNTER — OFFICE VISIT (OUTPATIENT)
Dept: FAMILY MEDICINE CLINIC | Age: 88
End: 2023-05-22

## 2023-05-22 VITALS
DIASTOLIC BLOOD PRESSURE: 70 MMHG | HEIGHT: 62 IN | OXYGEN SATURATION: 94 % | WEIGHT: 133.8 LBS | HEART RATE: 65 BPM | SYSTOLIC BLOOD PRESSURE: 128 MMHG | BODY MASS INDEX: 24.62 KG/M2

## 2023-05-22 DIAGNOSIS — F33.0 MAJOR DEPRESSIVE DISORDER, RECURRENT, MILD (HCC): ICD-10-CM

## 2023-05-22 DIAGNOSIS — Z00.00 MEDICARE ANNUAL WELLNESS VISIT, SUBSEQUENT: Primary | ICD-10-CM

## 2023-05-22 DIAGNOSIS — I10 ESSENTIAL HYPERTENSION: ICD-10-CM

## 2023-05-22 LAB
ALBUMIN SERPL-MCNC: 4.4 G/DL (ref 3.4–5)
ALBUMIN/GLOB SERPL: 1.9 {RATIO} (ref 1.1–2.2)
ALP SERPL-CCNC: 56 U/L (ref 40–129)
ALT SERPL-CCNC: 11 U/L (ref 10–40)
ANION GAP SERPL CALCULATED.3IONS-SCNC: 9 MMOL/L (ref 3–16)
AST SERPL-CCNC: 21 U/L (ref 15–37)
BILIRUB SERPL-MCNC: 0.3 MG/DL (ref 0–1)
BUN SERPL-MCNC: 18 MG/DL (ref 7–20)
CALCIUM SERPL-MCNC: 10.5 MG/DL (ref 8.3–10.6)
CHLORIDE SERPL-SCNC: 105 MMOL/L (ref 99–110)
CHOLEST SERPL-MCNC: 162 MG/DL (ref 0–199)
CO2 SERPL-SCNC: 29 MMOL/L (ref 21–32)
CREAT SERPL-MCNC: 0.7 MG/DL (ref 0.6–1.2)
GFR SERPLBLD CREATININE-BSD FMLA CKD-EPI: >60 ML/MIN/{1.73_M2}
GLUCOSE SERPL-MCNC: 94 MG/DL (ref 70–99)
HDLC SERPL-MCNC: 51 MG/DL (ref 40–60)
LDLC SERPL CALC-MCNC: 100 MG/DL
POTASSIUM SERPL-SCNC: 4.7 MMOL/L (ref 3.5–5.1)
PROT SERPL-MCNC: 6.7 G/DL (ref 6.4–8.2)
SODIUM SERPL-SCNC: 143 MMOL/L (ref 136–145)
TRIGL SERPL-MCNC: 56 MG/DL (ref 0–150)
VLDLC SERPL CALC-MCNC: 11 MG/DL

## 2023-05-22 RX ORDER — LISINOPRIL 20 MG/1
TABLET ORAL
Qty: 180 TABLET | Refills: 3 | Status: SHIPPED | OUTPATIENT
Start: 2023-05-22

## 2023-05-22 SDOH — ECONOMIC STABILITY: INCOME INSECURITY: HOW HARD IS IT FOR YOU TO PAY FOR THE VERY BASICS LIKE FOOD, HOUSING, MEDICAL CARE, AND HEATING?: NOT VERY HARD

## 2023-05-22 SDOH — ECONOMIC STABILITY: FOOD INSECURITY: WITHIN THE PAST 12 MONTHS, YOU WORRIED THAT YOUR FOOD WOULD RUN OUT BEFORE YOU GOT MONEY TO BUY MORE.: NEVER TRUE

## 2023-05-22 SDOH — ECONOMIC STABILITY: HOUSING INSECURITY
IN THE LAST 12 MONTHS, WAS THERE A TIME WHEN YOU DID NOT HAVE A STEADY PLACE TO SLEEP OR SLEPT IN A SHELTER (INCLUDING NOW)?: NO

## 2023-05-22 SDOH — ECONOMIC STABILITY: FOOD INSECURITY: WITHIN THE PAST 12 MONTHS, THE FOOD YOU BOUGHT JUST DIDN'T LAST AND YOU DIDN'T HAVE MONEY TO GET MORE.: NEVER TRUE

## 2023-05-22 ASSESSMENT — PATIENT HEALTH QUESTIONNAIRE - PHQ9
9. THOUGHTS THAT YOU WOULD BE BETTER OFF DEAD, OR OF HURTING YOURSELF: 0
SUM OF ALL RESPONSES TO PHQ QUESTIONS 1-9: 0
SUM OF ALL RESPONSES TO PHQ QUESTIONS 1-9: 0
4. FEELING TIRED OR HAVING LITTLE ENERGY: 0
7. TROUBLE CONCENTRATING ON THINGS, SUCH AS READING THE NEWSPAPER OR WATCHING TELEVISION: 0
10. IF YOU CHECKED OFF ANY PROBLEMS, HOW DIFFICULT HAVE THESE PROBLEMS MADE IT FOR YOU TO DO YOUR WORK, TAKE CARE OF THINGS AT HOME, OR GET ALONG WITH OTHER PEOPLE: 0
2. FEELING DOWN, DEPRESSED OR HOPELESS: 0
6. FEELING BAD ABOUT YOURSELF - OR THAT YOU ARE A FAILURE OR HAVE LET YOURSELF OR YOUR FAMILY DOWN: 0
SUM OF ALL RESPONSES TO PHQ QUESTIONS 1-9: 0
8. MOVING OR SPEAKING SO SLOWLY THAT OTHER PEOPLE COULD HAVE NOTICED. OR THE OPPOSITE, BEING SO FIGETY OR RESTLESS THAT YOU HAVE BEEN MOVING AROUND A LOT MORE THAN USUAL: 0
SUM OF ALL RESPONSES TO PHQ QUESTIONS 1-9: 0
SUM OF ALL RESPONSES TO PHQ9 QUESTIONS 1 & 2: 0
3. TROUBLE FALLING OR STAYING ASLEEP: 0
1. LITTLE INTEREST OR PLEASURE IN DOING THINGS: 0
5. POOR APPETITE OR OVEREATING: 0

## 2023-05-22 ASSESSMENT — LIFESTYLE VARIABLES
HOW MANY STANDARD DRINKS CONTAINING ALCOHOL DO YOU HAVE ON A TYPICAL DAY: PATIENT DOES NOT DRINK
HOW OFTEN DO YOU HAVE A DRINK CONTAINING ALCOHOL: NEVER

## 2023-05-22 NOTE — PROGRESS NOTES
Medicare Annual Wellness Visit    Abner Arriola is here for Medicare AWV (Patient is here for AWV, requesting bilateral ear lavage. )    Assessment & Plan   Medicare annual wellness visit, subsequent  Essential hypertension  -     lisinopril (PRINIVIL;ZESTRIL) 20 MG tablet; TAKE ONE TABLET BY MOUTH EVERY MORNING AND TAKE ONE TABLET BY MOUTH EVERY NIGHT AT BEDTIME, Disp-180 tablet, R-3Normal  Major depressive disorder, recurrent, mild   Stable on celexa     Recommendations for Preventive Services Due: see orders and patient instructions/AVS.  Recommended screening schedule for the next 5-10 years is provided to the patient in written form: see Patient Instructions/AVS.     Return for Medicare Annual Wellness Visit in 1 year. Subjective     Patient's complete Health Risk Assessment and screening values have been reviewed and are found in Flowsheets. The following problems were reviewed today and where indicated follow up appointments were made and/or referrals ordered. Positive Risk Factor Screenings with Interventions:    Fall Risk:  Do you feel unsteady or are you worried about falling? : (!) yes  2 or more falls in past year?: (!) yes  Fall with injury in past year?: no     Interventions:    Right knee is a problem but doesn't want a replacement. Weight and Activity:  Physical Activity: Inactive    Days of Exercise per Week: 0 days    Minutes of Exercise per Session: 30 min     On average, how many days per week do you engage in moderate to strenuous exercise (like a brisk walk)?: 0 days  Have you lost any weight without trying in the past 3 months?: No  Body mass index is 24.47 kg/m².       Inactivity Interventions:  Patient declined any further interventions or treatment       Hearing Screen:  Do you or your family notice any trouble with your hearing that hasn't been managed with hearing aids?: (!) Yes    Interventions:  Referred to Audiology     Safety:  Do you have any tripping hazards -

## 2023-05-23 ASSESSMENT — PATIENT HEALTH QUESTIONNAIRE - PHQ9
8. MOVING OR SPEAKING SO SLOWLY THAT OTHER PEOPLE COULD HAVE NOTICED. OR THE OPPOSITE, BEING SO FIGETY OR RESTLESS THAT YOU HAVE BEEN MOVING AROUND A LOT MORE THAN USUAL: 0
SUM OF ALL RESPONSES TO PHQ QUESTIONS 1-9: 0
SUM OF ALL RESPONSES TO PHQ QUESTIONS 1-9: 0
4. FEELING TIRED OR HAVING LITTLE ENERGY: 0
6. FEELING BAD ABOUT YOURSELF - OR THAT YOU ARE A FAILURE OR HAVE LET YOURSELF OR YOUR FAMILY DOWN: 0
7. TROUBLE CONCENTRATING ON THINGS, SUCH AS READING THE NEWSPAPER OR WATCHING TELEVISION: 0
SUM OF ALL RESPONSES TO PHQ QUESTIONS 1-9: 0
3. TROUBLE FALLING OR STAYING ASLEEP: 0
1. LITTLE INTEREST OR PLEASURE IN DOING THINGS: 0
9. THOUGHTS THAT YOU WOULD BE BETTER OFF DEAD, OR OF HURTING YOURSELF: 0
5. POOR APPETITE OR OVEREATING: 0
SUM OF ALL RESPONSES TO PHQ9 QUESTIONS 1 & 2: 0
2. FEELING DOWN, DEPRESSED OR HOPELESS: 0
10. IF YOU CHECKED OFF ANY PROBLEMS, HOW DIFFICULT HAVE THESE PROBLEMS MADE IT FOR YOU TO DO YOUR WORK, TAKE CARE OF THINGS AT HOME, OR GET ALONG WITH OTHER PEOPLE: 0
SUM OF ALL RESPONSES TO PHQ QUESTIONS 1-9: 0

## 2023-06-26 DIAGNOSIS — I10 ESSENTIAL HYPERTENSION: ICD-10-CM

## 2023-06-27 RX ORDER — AMLODIPINE BESYLATE 10 MG/1
TABLET ORAL
Qty: 90 TABLET | Refills: 1 | Status: SHIPPED | OUTPATIENT
Start: 2023-06-27

## 2023-07-25 NOTE — TELEPHONE ENCOUNTER
----- Message from Chioma Bess sent at 4/25/2022 10:49 AM EDT -----  Subject: Refill Request    QUESTIONS  Name of Medication? lisinopril (PRINIVIL;ZESTRIL) 20 MG tablet  Patient-reported dosage and instructions? 1 - 20 mg tablet - 2 times per day   How many days do you have left? 8  Preferred [facility]? [facility] [id number]  Pharmacy phone number (if available)? [phone]  Additional Information for Provider? Patient has appointment scheduled for 5/2/2022 for medication renewal and wellness check up.   -----------------------------------------------------------------------------------------  [address] [employer]  What is the best way for the office to contact you? OK to leave message on   voicemail  Preferred Call Back Phone Number? [phone]  -----------------------------------------------------------------------------------------  SCRIPT ANSWERS  Relationship to Patient?  Self Head, normocephalic, atraumatic, Face, Face within normal limits, Ears, External ears within normal limits, Nose/Nasopharynx, External nose normal appearance, nares patent, no nasal discharge, Mouth and Throat, Oral cavity appearance normal, Lips, Appearance normal

## 2023-09-14 DIAGNOSIS — I10 ESSENTIAL HYPERTENSION: ICD-10-CM

## 2023-09-14 DIAGNOSIS — F32.A DEPRESSION, UNSPECIFIED DEPRESSION TYPE: ICD-10-CM

## 2023-09-14 NOTE — TELEPHONE ENCOUNTER
Patient is calling in asking if we can send in her prescriptions [facility]. Her insurance has changed and needs a 90 day supply sent.

## 2023-09-14 NOTE — TELEPHONE ENCOUNTER
Medication:   Requested Prescriptions     Pending Prescriptions Disp Refills    lisinopril (PRINIVIL;ZESTRIL) 20 MG tablet 180 tablet 3     Sig: TAKE ONE TABLET BY MOUTH EVERY MORNING AND TAKE ONE TABLET BY MOUTH EVERY NIGHT AT BEDTIME    amLODIPine (NORVASC) 10 MG tablet 90 tablet 1     Sig: Take 1 tablet by mouth daily    citalopram (CELEXA) 20 MG tablet 90 tablet 3     Sig: Take 1 tablet by mouth daily    hydroCHLOROthiazide (MICROZIDE) 12.5 MG capsule 90 capsule 3     Sig: TAKE ONE CAPSULE BY MOUTH EVERY MORNING        Last Filled:  6/27/23    Patient Phone Number: [phone] (home)     Last appt: 5/22/2023   Next appt: Visit date not found    Last OARRS:        No data to display

## 2023-09-15 RX ORDER — LISINOPRIL 20 MG/1
TABLET ORAL
Qty: 180 TABLET | Refills: 3 | Status: SHIPPED | OUTPATIENT
Start: 2023-09-15

## 2023-09-15 RX ORDER — AMLODIPINE BESYLATE 10 MG/1
10 TABLET ORAL DAILY
Qty: 90 TABLET | Refills: 1 | Status: SHIPPED | OUTPATIENT
Start: 2023-09-15

## 2023-09-15 RX ORDER — HYDROCHLOROTHIAZIDE 12.5 MG/1
CAPSULE, GELATIN COATED ORAL
Qty: 90 CAPSULE | Refills: 3 | Status: SHIPPED | OUTPATIENT
Start: 2023-09-15

## 2023-09-15 RX ORDER — CITALOPRAM 20 MG/1
20 TABLET ORAL DAILY
Qty: 90 TABLET | Refills: 3 | Status: SHIPPED | OUTPATIENT
Start: 2023-09-15

## 2024-01-29 ENCOUNTER — OFFICE VISIT (OUTPATIENT)
Dept: FAMILY MEDICINE CLINIC | Age: 89
End: 2024-01-29
Payer: MEDICARE

## 2024-01-29 VITALS
HEART RATE: 71 BPM | DIASTOLIC BLOOD PRESSURE: 76 MMHG | WEIGHT: 133 LBS | HEIGHT: 62 IN | BODY MASS INDEX: 24.48 KG/M2 | SYSTOLIC BLOOD PRESSURE: 132 MMHG | OXYGEN SATURATION: 96 %

## 2024-01-29 DIAGNOSIS — M16.10 PRIMARY OSTEOARTHRITIS OF HIP, UNSPECIFIED LATERALITY: ICD-10-CM

## 2024-01-29 DIAGNOSIS — I10 PRIMARY HYPERTENSION: Primary | ICD-10-CM

## 2024-01-29 PROCEDURE — 99213 OFFICE O/P EST LOW 20 MIN: CPT | Performed by: FAMILY MEDICINE

## 2024-01-29 PROCEDURE — 1123F ACP DISCUSS/DSCN MKR DOCD: CPT | Performed by: FAMILY MEDICINE

## 2024-01-29 ASSESSMENT — PATIENT HEALTH QUESTIONNAIRE - PHQ9
4. FEELING TIRED OR HAVING LITTLE ENERGY: 1
SUM OF ALL RESPONSES TO PHQ QUESTIONS 1-9: 1
10. IF YOU CHECKED OFF ANY PROBLEMS, HOW DIFFICULT HAVE THESE PROBLEMS MADE IT FOR YOU TO DO YOUR WORK, TAKE CARE OF THINGS AT HOME, OR GET ALONG WITH OTHER PEOPLE: 0
SUM OF ALL RESPONSES TO PHQ9 QUESTIONS 1 & 2: 0
8. MOVING OR SPEAKING SO SLOWLY THAT OTHER PEOPLE COULD HAVE NOTICED. OR THE OPPOSITE, BEING SO FIGETY OR RESTLESS THAT YOU HAVE BEEN MOVING AROUND A LOT MORE THAN USUAL: 0
SUM OF ALL RESPONSES TO PHQ QUESTIONS 1-9: 1
SUM OF ALL RESPONSES TO PHQ QUESTIONS 1-9: 1
5. POOR APPETITE OR OVEREATING: 0
3. TROUBLE FALLING OR STAYING ASLEEP: 0
2. FEELING DOWN, DEPRESSED OR HOPELESS: 0
9. THOUGHTS THAT YOU WOULD BE BETTER OFF DEAD, OR OF HURTING YOURSELF: 0
7. TROUBLE CONCENTRATING ON THINGS, SUCH AS READING THE NEWSPAPER OR WATCHING TELEVISION: 0
1. LITTLE INTEREST OR PLEASURE IN DOING THINGS: 0
6. FEELING BAD ABOUT YOURSELF - OR THAT YOU ARE A FAILURE OR HAVE LET YOURSELF OR YOUR FAMILY DOWN: 0
SUM OF ALL RESPONSES TO PHQ QUESTIONS 1-9: 1

## 2024-01-29 NOTE — PROGRESS NOTES
Kimberly Dias (:  3/28/1934) is a 89 y.o. female,Established patient, here for evaluation of the following chief complaint(s):  Follow-up (Pt requesting handicap letter )         ASSESSMENT/PLAN:  Kimberly was seen today for follow-up.    Diagnoses and all orders for this visit:    Primary hypertension  The current medical regimen is effective;  continue present plan and medications.   Primary osteoarthritis of hip, unspecified laterality  Stable.   Rx for handicap placard       No follow-ups on file.         Subjective   SUBJECTIVE/OBJECTIVE:  HPI  Pt is a of 89 y.o. female comes in today with   Chief Complaint   Patient presents with    Follow-up     Pt requesting handicap letter      Feeling well.  Requesting handicap letter    Vitals:    24 1301   BP: 132/76   Pulse: 71   SpO2: 96%   Weight: 60.3 kg (133 lb)   Height: 1.575 m (5' 2\")       Past Medical History:Reviewed  Medications:Reviewed.  Allergies   Allergen Reactions    Codeine Nausea And Vomiting    Sulfa Antibiotics Nausea And Vomiting      Social hx:Reviewed.  Social History     Tobacco Use   Smoking Status Never   Smokeless Tobacco Never     Review of Systems   Constitutional: Negative.           Objective   Physical Exam         An electronic signature was used to authenticate this note.    --Cole Machado MD

## 2024-02-08 DIAGNOSIS — I10 ESSENTIAL HYPERTENSION: ICD-10-CM

## 2024-02-09 RX ORDER — AMLODIPINE BESYLATE 10 MG/1
10 TABLET ORAL DAILY
Qty: 90 TABLET | Refills: 3 | Status: SHIPPED | OUTPATIENT
Start: 2024-02-09

## 2024-03-11 ENCOUNTER — TELEPHONE (OUTPATIENT)
Dept: FAMILY MEDICINE CLINIC | Age: 89
End: 2024-03-11

## 2024-03-11 NOTE — TELEPHONE ENCOUNTER
----- Message from Kimberly Yeager sent at 3/11/2024  3:27 PM EDT -----  Subject: Message to Provider    QUESTIONS  Information for Provider? pt needs both ears flushed out asap ,no pain   ,having trouble hearing   ---------------------------------------------------------------------------  --------------  CALL BACK INFO  [phone]; OK to leave message on voicemail  ---------------------------------------------------------------------------  --------------  SCRIPT ANSWERS  Relationship to Patient? Self  (Is the patient requesting to see the provider for a procedure?)? Yes

## 2024-03-13 ENCOUNTER — OFFICE VISIT (OUTPATIENT)
Dept: FAMILY MEDICINE CLINIC | Age: 89
End: 2024-03-13
Payer: MEDICARE

## 2024-03-13 VITALS
OXYGEN SATURATION: 96 % | HEIGHT: 62 IN | DIASTOLIC BLOOD PRESSURE: 68 MMHG | BODY MASS INDEX: 24.48 KG/M2 | WEIGHT: 133 LBS | SYSTOLIC BLOOD PRESSURE: 130 MMHG | HEART RATE: 71 BPM

## 2024-03-13 DIAGNOSIS — F33.0 MAJOR DEPRESSIVE DISORDER, RECURRENT, MILD (HCC): ICD-10-CM

## 2024-03-13 DIAGNOSIS — H61.23 HEARING LOSS OF BOTH EARS DUE TO CERUMEN IMPACTION: Primary | ICD-10-CM

## 2024-03-13 PROCEDURE — 1123F ACP DISCUSS/DSCN MKR DOCD: CPT | Performed by: FAMILY MEDICINE

## 2024-03-13 PROCEDURE — 99213 OFFICE O/P EST LOW 20 MIN: CPT | Performed by: FAMILY MEDICINE

## 2024-03-13 NOTE — PROGRESS NOTES
Kimberly Dias (:  3/28/1934) is a 89 y.o. female,Established patient, here for evaluation of the following chief complaint(s):  Other (Wants ears cleaned out)         ASSESSMENT/PLAN:  Kimberly was seen today for other.    Diagnoses and all orders for this visit:    Hearing loss of both ears due to cerumen impaction  Ceruminosis is noted.  Wax is removed by syringing and manual debridement. Instructions for home care to prevent wax buildup are given.   Major depressive disorder, recurrent, mild (HCC)  Stable on celexa       No follow-ups on file.         Subjective   SUBJECTIVE/OBJECTIVE:  HPI  Pt is a of 89 y.o. female comes in today with   Chief Complaint   Patient presents with    Other     Wants ears cleaned out   Decreased hearing both ears. No pain  Vitals:    24 0903   BP: 130/68   Pulse: 71   SpO2: 96%   Weight: 60.3 kg (133 lb)   Height: 1.575 m (5' 2\")       Past Medical History:Reviewed  Medications:Reviewed.  Allergies   Allergen Reactions    Codeine Nausea And Vomiting    Sulfa Antibiotics Nausea And Vomiting      Social hx:Reviewed.  Social History     Tobacco Use   Smoking Status Never   Smokeless Tobacco Never        Review of Systems       Objective   Physical Exam         An electronic signature was used to authenticate this note.    --Cole Machado MD

## 2024-03-14 ENCOUNTER — TELEPHONE (OUTPATIENT)
Dept: FAMILY MEDICINE CLINIC | Age: 89
End: 2024-03-14

## 2024-03-14 NOTE — TELEPHONE ENCOUNTER
Patient is calling in stating that ever since her visit yesterday she has had a runny nose and a hoarse voice. She also has been sneezing a lot. She wants to know if this is an after affect of the ear cleaning that was done. She has been taking an OTC cold medication with no relief. She wants to know if there is something either OTC or prescribed that  can give her. Please advise.

## 2024-03-14 NOTE — TELEPHONE ENCOUNTER
Could be from allergies or could be getting a cold.  Wouldn't be related to ear cleaning.  Can use flonase and/or zyrtec

## 2024-03-21 ENCOUNTER — OFFICE VISIT (OUTPATIENT)
Dept: FAMILY MEDICINE CLINIC | Age: 89
End: 2024-03-21

## 2024-03-21 VITALS
SYSTOLIC BLOOD PRESSURE: 138 MMHG | TEMPERATURE: 97.8 F | HEIGHT: 62 IN | WEIGHT: 130 LBS | HEART RATE: 70 BPM | DIASTOLIC BLOOD PRESSURE: 72 MMHG | BODY MASS INDEX: 23.92 KG/M2 | OXYGEN SATURATION: 97 %

## 2024-03-21 DIAGNOSIS — H66.91 ACUTE RIGHT OTITIS MEDIA: Primary | ICD-10-CM

## 2024-03-21 DIAGNOSIS — B96.89 ACUTE BACTERIAL SINUSITIS: ICD-10-CM

## 2024-03-21 DIAGNOSIS — J01.90 ACUTE BACTERIAL SINUSITIS: ICD-10-CM

## 2024-03-21 RX ORDER — AMOXICILLIN AND CLAVULANATE POTASSIUM 875; 125 MG/1; MG/1
1 TABLET, FILM COATED ORAL 2 TIMES DAILY
Qty: 20 TABLET | Refills: 0 | Status: SHIPPED | OUTPATIENT
Start: 2024-03-21 | End: 2024-03-31

## 2024-03-21 NOTE — PROGRESS NOTES
Kimberly Dias (:  3/28/1934) is a 89 y.o. female,Established patient, here for evaluation of the following chief complaint(s):  Other (Sinus pressure)       ASSESSMENT/PLAN:  1. Acute right otitis media  Not progressing;  Begin Amox/clavu.    -     amoxicillin-clavulanate (AUGMENTIN) 875-125 MG per tablet; Take 1 tablet by mouth 2 times daily for 10 days Take with meals., Disp-20 tablet, R-0Normal  2. Acute bacterial sinusitis  Not progressing;  Begin Amox/clavu.  Continue flonase and claritin.  Discussed steam, humidifier and plenty of fluids.  COVID swab completed.  -     amoxicillin-clavulanate (AUGMENTIN) 875-125 MG per tablet; Take 1 tablet by mouth 2 times daily for 10 days Take with meals., Disp-20 tablet, R-0Normal  -     COVID-19      Return if symptoms worsen or fail to improve.       Subjective   SUBJECTIVE/OBJECTIVE:  HPI  Symptoms started day of appointment with Dr. Machado 3/13/2024  Went home- sneezing, runny nose, hoarse voice  Recommended zyrtec and flonase- taking daily  Sometimes can blow out a lot of mucus; sometimes can't  Decreased hearing  Sinus pressure/ teeth are hurting  Headaches  Cold all the time  Zinc tablets, zyrtec, flonase    Review of Systems       Objective   Physical Exam  Vitals reviewed.   Constitutional:       Appearance: Normal appearance.   HENT:      Head: Normocephalic.      Right Ear: Ear canal and external ear normal. Tympanic membrane is erythematous.      Left Ear: Tympanic membrane, ear canal and external ear normal.      Nose: Congestion present.      Right Sinus: Maxillary sinus tenderness and frontal sinus tenderness present.      Left Sinus: Maxillary sinus tenderness and frontal sinus tenderness present.      Mouth/Throat:      Lips: Pink.      Mouth: Mucous membranes are moist.      Pharynx: Oropharynx is clear. Uvula midline.   Cardiovascular:      Rate and Rhythm: Normal rate and regular rhythm.      Heart sounds: Normal heart sounds, S1 normal and S2

## 2024-03-22 ENCOUNTER — TELEPHONE (OUTPATIENT)
Dept: FAMILY MEDICINE CLINIC | Age: 89
End: 2024-03-22

## 2024-03-22 LAB — SARS-COV-2 RNA RESP QL NAA+PROBE: DETECTED

## 2024-03-22 NOTE — TELEPHONE ENCOUNTER
----- Message from GEOVANI Kimbrough - CNP sent at 3/22/2024 11:23 AM EDT -----  Please let patient know her COVID was positive.  How is she feeling?

## 2024-03-22 NOTE — TELEPHONE ENCOUNTER
Called and spoke with patient,and patient advised me that she still feels the same since yesterday not getting better and she also advised that she is taking the medication that was prescribed.

## 2024-03-26 NOTE — TELEPHONE ENCOUNTER
Patient called back and states that she is doing much better. She thanks Rhea for reaching out to her.

## 2024-03-29 DIAGNOSIS — H66.91 ACUTE RIGHT OTITIS MEDIA: ICD-10-CM

## 2024-03-29 DIAGNOSIS — B96.89 ACUTE BACTERIAL SINUSITIS: ICD-10-CM

## 2024-03-29 DIAGNOSIS — J01.90 ACUTE BACTERIAL SINUSITIS: ICD-10-CM

## 2024-03-29 NOTE — TELEPHONE ENCOUNTER
Patient is calling in stating that she is still hoarse and would like to know if more antibiotics can be sent in. Please [facility] [address]

## 2024-04-01 RX ORDER — AMOXICILLIN AND CLAVULANATE POTASSIUM 875; 125 MG/1; MG/1
TABLET, FILM COATED ORAL
Qty: 20 TABLET | Refills: 0 | OUTPATIENT
Start: 2024-04-01

## 2024-04-01 NOTE — TELEPHONE ENCOUNTER
Medication:   Requested Prescriptions     Pending Prescriptions Disp Refills    amoxicillin-clavulanate (AUGMENTIN) 875-125 MG per tablet [Pharmacy Med Name: Amoxicillin-Pot Clavulanate 875-125 MG Oral Tablet] 20 tablet 0     Sig: TAKE 1 TABLET BY MOUTH TWICE DAILY WITH MEALS FOR 10 DAYS        Last Filled:  3/21/24    Patient Phone Number: [phone] (home)     Last appt: 3/21/2024   Next appt: Visit date not found    Last OARRS:        No data to display

## 2024-06-17 ENCOUNTER — OFFICE VISIT (OUTPATIENT)
Dept: FAMILY MEDICINE CLINIC | Age: 89
End: 2024-06-17

## 2024-06-17 VITALS
HEART RATE: 64 BPM | OXYGEN SATURATION: 95 % | SYSTOLIC BLOOD PRESSURE: 154 MMHG | BODY MASS INDEX: 24.11 KG/M2 | DIASTOLIC BLOOD PRESSURE: 64 MMHG | WEIGHT: 131 LBS | HEIGHT: 62 IN

## 2024-06-17 DIAGNOSIS — G25.0 ESSENTIAL TREMOR: ICD-10-CM

## 2024-06-17 DIAGNOSIS — M25.562 LEFT KNEE PAIN, UNSPECIFIED CHRONICITY: ICD-10-CM

## 2024-06-17 DIAGNOSIS — R41.3 MEMORY CHANGES: ICD-10-CM

## 2024-06-17 DIAGNOSIS — Z00.00 MEDICARE ANNUAL WELLNESS VISIT, SUBSEQUENT: Primary | ICD-10-CM

## 2024-06-17 DIAGNOSIS — R20.0 HAND NUMBNESS: ICD-10-CM

## 2024-06-17 DIAGNOSIS — F33.0 MAJOR DEPRESSIVE DISORDER, RECURRENT, MILD (HCC): ICD-10-CM

## 2024-06-17 DIAGNOSIS — R21 SKIN RASH: ICD-10-CM

## 2024-06-17 DIAGNOSIS — I10 ESSENTIAL HYPERTENSION: ICD-10-CM

## 2024-06-17 RX ORDER — CLOTRIMAZOLE 1 %
CREAM (GRAM) TOPICAL
Qty: 45 G | Refills: 1 | Status: SHIPPED | OUTPATIENT
Start: 2024-06-17 | End: 2024-06-24

## 2024-06-17 SDOH — ECONOMIC STABILITY: FOOD INSECURITY: WITHIN THE PAST 12 MONTHS, YOU WORRIED THAT YOUR FOOD WOULD RUN OUT BEFORE YOU GOT MONEY TO BUY MORE.: NEVER TRUE

## 2024-06-17 SDOH — ECONOMIC STABILITY: FOOD INSECURITY: WITHIN THE PAST 12 MONTHS, THE FOOD YOU BOUGHT JUST DIDN'T LAST AND YOU DIDN'T HAVE MONEY TO GET MORE.: NEVER TRUE

## 2024-06-17 SDOH — ECONOMIC STABILITY: INCOME INSECURITY: HOW HARD IS IT FOR YOU TO PAY FOR THE VERY BASICS LIKE FOOD, HOUSING, MEDICAL CARE, AND HEATING?: NOT VERY HARD

## 2024-06-17 ASSESSMENT — PATIENT HEALTH QUESTIONNAIRE - PHQ9
SUM OF ALL RESPONSES TO PHQ QUESTIONS 1-9: 2
SUM OF ALL RESPONSES TO PHQ9 QUESTIONS 1 & 2: 2
SUM OF ALL RESPONSES TO PHQ QUESTIONS 1-9: 2
1. LITTLE INTEREST OR PLEASURE IN DOING THINGS: SEVERAL DAYS
2. FEELING DOWN, DEPRESSED OR HOPELESS: SEVERAL DAYS

## 2024-06-17 ASSESSMENT — LIFESTYLE VARIABLES
HOW OFTEN DO YOU HAVE A DRINK CONTAINING ALCOHOL: NEVER
HOW MANY STANDARD DRINKS CONTAINING ALCOHOL DO YOU HAVE ON A TYPICAL DAY: PATIENT DOES NOT DRINK

## 2024-06-17 NOTE — PROGRESS NOTES
Medicare Annual Wellness Visit  Name: Kimberly Dias Today’s Date: 2024   MRN: [id number] Sex: Female   Age: 89 y.o. Ethnicity: Non- / Non    : 3/28/1934 Race: White (non-)      Kimberly Dias is here for Medicare AWV    Screenings for behavioral, psychosocial and functional/safety risks, and cognitive dysfunction are all negative except as indicated below. These results, as well as other patient data from the Health Risk Assessment form, are documented in Flowsheets linked to this Encounter.    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 Authorizing Provider   clotrimazole (LOTRIMIN AF) 1 % cream Apply topically 2 times daily. Yes Rhea Oneal APRN - CNP   amLODIPine (NORVASC) 10 MG tablet TAKE 1 TABLET DAILY Yes Cole Machado MD   lisinopril (PRINIVIL;ZESTRIL) 20 MG tablet TAKE ONE TABLET BY MOUTH EVERY MORNING AND TAKE ONE TABLET BY MOUTH EVERY NIGHT AT BEDTIME Yes Cole Machado MD

## 2024-06-18 DIAGNOSIS — I10 ESSENTIAL HYPERTENSION: ICD-10-CM

## 2024-06-18 DIAGNOSIS — R41.3 MEMORY CHANGES: ICD-10-CM

## 2024-06-18 LAB
ANION GAP SERPL CALCULATED.3IONS-SCNC: 7 MMOL/L (ref 3–16)
BUN SERPL-MCNC: 21 MG/DL (ref 7–20)
CALCIUM SERPL-MCNC: 10.3 MG/DL (ref 8.3–10.6)
CHLORIDE SERPL-SCNC: 102 MMOL/L (ref 99–110)
CHOLEST SERPL-MCNC: 156 MG/DL (ref 0–199)
CO2 SERPL-SCNC: 33 MMOL/L (ref 21–32)
CREAT SERPL-MCNC: 0.8 MG/DL (ref 0.6–1.2)
DEPRECATED RDW RBC AUTO: 15 % (ref 12.4–15.4)
GFR SERPLBLD CREATININE-BSD FMLA CKD-EPI: 70 ML/MIN/{1.73_M2}
GLUCOSE SERPL-MCNC: 82 MG/DL (ref 70–99)
HCT VFR BLD AUTO: 43.1 % (ref 36–48)
HDLC SERPL-MCNC: 50 MG/DL (ref 40–60)
HGB BLD-MCNC: 14.4 G/DL (ref 12–16)
LDLC SERPL CALC-MCNC: 92 MG/DL
MCH RBC QN AUTO: 34.7 PG (ref 26–34)
MCHC RBC AUTO-ENTMCNC: 33.4 G/DL (ref 31–36)
MCV RBC AUTO: 104 FL (ref 80–100)
PLATELET # BLD AUTO: 322 K/UL (ref 135–450)
PMV BLD AUTO: 9.2 FL (ref 5–10.5)
POTASSIUM SERPL-SCNC: 3.9 MMOL/L (ref 3.5–5.1)
RBC # BLD AUTO: 4.15 M/UL (ref 4–5.2)
SODIUM SERPL-SCNC: 142 MMOL/L (ref 136–145)
TRIGL SERPL-MCNC: 69 MG/DL (ref 0–150)
TSH SERPL DL<=0.005 MIU/L-ACNC: 2.36 UIU/ML (ref 0.27–4.2)
VIT B12 SERPL-MCNC: 602 PG/ML (ref 211–911)
VLDLC SERPL CALC-MCNC: 14 MG/DL
WBC # BLD AUTO: 6.7 K/UL (ref 4–11)

## 2024-06-25 ENCOUNTER — HOSPITAL ENCOUNTER (OUTPATIENT)
Dept: GENERAL RADIOLOGY | Age: 89
Discharge: HOME | End: 2024-06-25
Payer: MEDICARE

## 2024-06-25 DIAGNOSIS — R20.0 HAND NUMBNESS: ICD-10-CM

## 2024-06-25 PROCEDURE — 72040 X-RAY EXAM NECK SPINE 2-3 VW: CPT

## 2024-06-27 ENCOUNTER — OFFICE VISIT (OUTPATIENT)
Dept: FAMILY MEDICINE CLINIC | Age: 89
End: 2024-06-27

## 2024-06-27 VITALS
WEIGHT: 130 LBS | SYSTOLIC BLOOD PRESSURE: 116 MMHG | HEIGHT: 62 IN | OXYGEN SATURATION: 95 % | DIASTOLIC BLOOD PRESSURE: 64 MMHG | HEART RATE: 50 BPM | BODY MASS INDEX: 23.92 KG/M2

## 2024-06-27 DIAGNOSIS — M54.2 NECK PAIN: ICD-10-CM

## 2024-06-27 DIAGNOSIS — R20.0 HAND NUMBNESS: Primary | ICD-10-CM

## 2024-06-27 DIAGNOSIS — R41.3 MEMORY CHANGES: ICD-10-CM

## 2024-06-27 RX ORDER — MEMANTINE HYDROCHLORIDE 5 MG/1
5 TABLET ORAL DAILY
Qty: 30 TABLET | Refills: 2 | Status: SHIPPED | OUTPATIENT
Start: 2024-06-27

## 2024-06-27 NOTE — PROGRESS NOTES
Kimberly Dias (:  3/28/1934) is a 89 y.o. female,Established patient, here for evaluation of the following chief complaint(s):  Results      Assessment & Plan   1. Hand numbness  Not progressing;  Reviewed x-ray.  MRI ordered.  Referral to PT.  -     MRI CERVICAL SPINE WO CONTRAST; Future  -     [facility] [place] [facility]OSR  2. Neck pain  Not progressing;  See 1  -     MRI CERVICAL SPINE WO CONTRAST; Future  -     [facility] [place] (Ortho [facility])-OSR  3. Memory changes  Not progressing;  Reviewed blood work.  Patient declines MRI.  Begin nameda.  Risks and benefits discussed.  -     memantine (NAMENDA) 5 MG tablet; Take 1 tablet by mouth daily, Disp-30 tablet, R-2Normal      Return in about 4 weeks (around 2024), or if symptoms worsen or fail to improve.       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   Constitutional:       Appearance: Normal appearance.   HENT:      Head: Normocephalic.      Right Ear: External ear normal.      Left Ear: External ear normal.      Nose: Nose normal.   Pulmonary:      Effort: No respiratory distress.   Neurological:      Mental Status: She is alert.   Psychiatric:         Mood and Affect: Mood normal.       An electronic signature was used to authenticate this note.    --Rhea Oneal, APRN - CNP

## 2024-07-10 ENCOUNTER — HOSPITAL ENCOUNTER (OUTPATIENT)
Dept: MRI IMAGING | Age: 89
Discharge: HOME | End: 2024-07-10
Payer: MEDICARE

## 2024-07-10 ENCOUNTER — HOSPITAL ENCOUNTER (OUTPATIENT)
Dept: GENERAL RADIOLOGY | Age: 89
End: 2024-07-10
Payer: MEDICARE

## 2024-07-10 DIAGNOSIS — M54.2 NECK PAIN: ICD-10-CM

## 2024-07-10 DIAGNOSIS — R20.0 HAND NUMBNESS: ICD-10-CM

## 2024-07-10 PROCEDURE — 72141 MRI NECK SPINE W/O DYE: CPT

## 2024-07-12 ENCOUNTER — HOSPITAL ENCOUNTER (OUTPATIENT)
Dept: PHYSICAL THERAPY | Age: 89
Setting detail: THERAPIES SERIES
Discharge: HOME | End: 2024-07-12

## 2024-07-12 DIAGNOSIS — R20.0 BILATERAL HAND NUMBNESS: ICD-10-CM

## 2024-07-12 DIAGNOSIS — M54.2 NECK PAIN: Primary | ICD-10-CM

## 2024-07-12 NOTE — PLAN OF CARE
[facility]- Outpatient Rehabilitation and Therapy [address], [place] [address] office: [phone] fax: [phone]     Physical Therapy Initial Evaluation Certification      Dear Rhea Oneal,*,    We had the pleasure of evaluating the following patient for physical therapy services at [facility].  A summary of our findings can be found in the initial assessment below.  This includes our plan of care.  If you have any questions or concerns regarding these findings, please do not hesitate to contact me at the office phone number listed above.  Thank you for the referral.     Physician Signature:_______________________________Date:__________________  By signing above (or electronic signature), therapist’s plan is approved by physician       Physical Therapy: TREATMENT/PROGRESS NOTE   Patient: Kimberly iDas (89 y.o. female)   Examination Date: 2024   :  3/28/1934 MRN: [id number]   Visit #: 1   Insurance Allowable Auth Needed   [place] []Yes    []No    Insurance: Payor: [employer] MEDICARE / Plan: CIGNA PREFERRED MEDICARE HMO / Product Type: *No Product type* /   Insurance ID: [id number] - (Medicare Managed)  Secondary Insurance (if applicable):    Treatment Diagnosis: M54.2 Neck Pain    ICD-10-CM    1. Neck pain  M54.2       2. Bilateral hand numbness  R20.0          Medical Diagnosis:  Hand numbness [R20.0]  Neck pain [M54.2]   Referring Physician: Rhea Oneal,*  PCP: Cole Machado MD     Plan of care signed (Y/N):     Date of Patient follow up with Physician:      Progress Report/POC: EVAL today  POC update due: (10 visits /OR AUTH LIMITS, whichever is less) 24                                            Precautions/ Contra-indications:           Latex allergy:  NO  Pacemaker:    NO  Contraindications for Manipulation: {Contra. to Manip.:33499::\"None\"}    Other:    Red Flags:  None    C-SSRS Triggered by Intake

## 2024-07-17 ENCOUNTER — HOSPITAL ENCOUNTER (OUTPATIENT)
Dept: PHYSICAL THERAPY | Age: 89
Setting detail: THERAPIES SERIES
Discharge: HOME | End: 2024-07-17
Payer: MEDICARE

## 2024-07-17 PROCEDURE — 97140 MANUAL THERAPY 1/> REGIONS: CPT

## 2024-07-17 PROCEDURE — 97112 NEUROMUSCULAR REEDUCATION: CPT

## 2024-07-17 PROCEDURE — 97110 THERAPEUTIC EXERCISES: CPT

## 2024-07-17 NOTE — FLOWSHEET NOTE
[facility]- Outpatient Rehabilitation and Therapy [address], [place] [address] office: [phone] fax: [phone]     Physical Therapy Daily Treatment Note      Physical Therapy: TREATMENT/PROGRESS NOTE   Patient: Kimberly Dias (89 y.o. female)   Examination Date: 2024   :  3/28/1934 MRN: [id number]   Visit #: 2   Insurance Allowable Auth Needed   MN []Yes    [x]No    Insurance: Payor: [employer] MEDICARE / Plan: CIGNA PREFERRED MEDICARE HMO / Product Type: *No Product type* /   Insurance ID: [id number] - (Medicare Managed)  Secondary Insurance (if applicable):    Treatment Diagnosis: M54.2 Neck Pain  No diagnosis found.     Medical Diagnosis:  Hand numbness [R20.0]  Neck pain [M54.2]   Referring Physician: Rhea Oneal,*  PCP: Cole Machado MD     Plan of care signed (Y/N):     Date of Patient follow up with Physician:      Progress Report/POC: EVAL today  POC update due: (10 visits /OR AUTH LIMITS, whichever is less) 24                                            Precautions/ Contra-indications:           Latex allergy:  NO  Pacemaker:    NO  Contraindications for Manipulation: None and osteoporosis     Other:    Red Flags:  None    C-SSRS Triggered by Intake questionnaire:   Patient answered 'NO' to both behavioral questions on intake.  No further screening warranted    Preferred Language for Healthcare:   [x] English       [] other:    SUBJECTIVE EXAMINATION     Patient stated complaint:  Patient stats that her hand are still numb but her neck seems to be moving better       Test used Initial score  2024   Pain Summary VAS 0-8 5/10   Functional questionnaire Neck Disability Index 30% disability    Other:                  OBJECTIVE EXAMINATION    C/S R Rot 45 deg       24  ROM/Strength: (Blank cells denote NT)    Mvmt (norm) AROM L AROM R Notes PROM L PROM R Notes     CERVICAL Flex (60) 38       Ext (70) 35       SB(45) 26 24

## 2024-07-31 ENCOUNTER — HOSPITAL ENCOUNTER (OUTPATIENT)
Dept: PHYSICAL THERAPY | Age: 89
Setting detail: THERAPIES SERIES
Discharge: HOME | End: 2024-07-31
Payer: MEDICARE

## 2024-07-31 PROCEDURE — 97140 MANUAL THERAPY 1/> REGIONS: CPT

## 2024-07-31 PROCEDURE — 97112 NEUROMUSCULAR REEDUCATION: CPT

## 2024-07-31 PROCEDURE — 97110 THERAPEUTIC EXERCISES: CPT

## 2024-08-01 NOTE — FLOWSHEET NOTE
[facility]- Outpatient Rehabilitation and Therapy [address], [place] [address] office: [phone] fax: [phone]     Physical Therapy Daily Treatment Note      Physical Therapy: TREATMENT/PROGRESS NOTE   Patient: Kimberly Dias (89 y.o. female)   Examination Date: 2024   :  3/28/1934 MRN: [id number]   Visit #: 3   Insurance Allowable Auth Needed   MN []Yes    [x]No    Insurance: Payor: [employer] MEDICARE / Plan: CIGNA PREFERRED MEDICARE HMO / Product Type: *No Product type* /   Insurance ID: [id number] - (Medicare Managed)  Secondary Insurance (if applicable):    Treatment Diagnosis: M54.2 Neck Pain  No diagnosis found.     Medical Diagnosis:  Hand numbness [R20.0]  Neck pain [M54.2]   Referring Physician: Rhea Oneal,*  PCP: Cole Machado MD     Plan of care signed (Y/N):     Date of Patient follow up with Physician:      Progress Report/POC: EVAL today  POC update due: (10 visits /OR AUTH LIMITS, whichever is less) 24                                            Precautions/ Contra-indications:           Latex allergy:  NO  Pacemaker:    NO  Contraindications for Manipulation: None and osteoporosis     Other:    Red Flags:  None    C-SSRS Triggered by Intake questionnaire:   Patient answered 'NO' to both behavioral questions on intake.  No further screening warranted    Preferred Language for Healthcare:   [x] English       [] other:    SUBJECTIVE EXAMINATION     Patient stated complaint:  Patient states that she has been doing her exercises at home        Test used Initial score  2024   Pain Summary VAS 0-8 510   Functional questionnaire Neck Disability Index 30% disability    Other:                  OBJECTIVE EXAMINATION    C/S R Rot 45 deg       24  ROM/Strength: (Blank cells denote NT)    Mvmt (norm) AROM L AROM R Notes PROM L PROM R Notes     CERVICAL Flex (60) 38       Ext (70) 35       SB(45) 26 24        Rotation (80)

## 2024-08-07 ENCOUNTER — HOSPITAL ENCOUNTER (OUTPATIENT)
Dept: PHYSICAL THERAPY | Age: 89
Setting detail: THERAPIES SERIES
Discharge: HOME | End: 2024-08-07
Payer: MEDICARE

## 2024-08-07 DIAGNOSIS — I10 ESSENTIAL HYPERTENSION: ICD-10-CM

## 2024-08-07 DIAGNOSIS — F32.A DEPRESSION, UNSPECIFIED DEPRESSION TYPE: ICD-10-CM

## 2024-08-07 PROCEDURE — 97140 MANUAL THERAPY 1/> REGIONS: CPT

## 2024-08-07 PROCEDURE — 97112 NEUROMUSCULAR REEDUCATION: CPT

## 2024-08-07 PROCEDURE — 97110 THERAPEUTIC EXERCISES: CPT

## 2024-08-07 RX ORDER — LISINOPRIL 20 MG/1
TABLET ORAL
Qty: 180 TABLET | Refills: 3 | Status: SHIPPED | OUTPATIENT
Start: 2024-08-07

## 2024-08-07 RX ORDER — CITALOPRAM 20 MG/1
20 TABLET ORAL DAILY
Qty: 90 TABLET | Refills: 3 | Status: SHIPPED | OUTPATIENT
Start: 2024-08-07

## 2024-08-07 RX ORDER — HYDROCHLOROTHIAZIDE 12.5 MG/1
CAPSULE, GELATIN COATED ORAL
Qty: 90 CAPSULE | Refills: 3 | Status: SHIPPED | OUTPATIENT
Start: 2024-08-07

## 2024-08-07 NOTE — TELEPHONE ENCOUNTER
Medication:   Requested Prescriptions     Pending Prescriptions Disp Refills    lisinopril (PRINIVIL;ZESTRIL) 20 MG tablet [Pharmacy Med Name: LISINOPRIL TABS 20MG] 180 tablet 3     Sig: TAKE 1 TABLET EVERY MORNING AND 1 TABLET EVERY NIGHT AT BEDTIME    hydroCHLOROthiazide 12.5 MG capsule [Pharmacy Med Name: HYDROCHLOROTHIAZIDE CAPS 12.5MG] 90 capsule 3     Sig: TAKE 1 CAPSULE EVERY MORNING    citalopram (CELEXA) 20 MG tablet [Pharmacy Med Name: CITALOPRAM HYDROBROMIDE TABS 20MG] 90 tablet 3     Sig: TAKE 1 TABLET DAILY       Last Filled:  9/15/23    Patient Phone Number: [phone] (home)     Last appt: 6/27/2024   Next appt: Visit date not found    Last Labs DM:   Lab Results   Component Value Date/Time    LABA1C 5.7 06/08/2014 12:00 AM     Last Lipid:   Lab Results   Component Value Date/Time    CHOL 156 06/18/2024 10:23 AM    TRIG 69 06/18/2024 10:23 AM    HDL 50 06/18/2024 10:23 AM     Last PSA: No results found for: \"PSA\"  Last Thyroid:   Lab Results   Component Value Date/Time    TSH 2.36 06/18/2024 10:23 AM

## 2024-08-08 NOTE — FLOWSHEET NOTE
[facility]- Outpatient Rehabilitation and Therapy [address], [place] [address] office: [phone] fax: [phone]     Physical Therapy Daily Treatment Note      Physical Therapy: TREATMENT/PROGRESS NOTE   Patient: Kimberly Dias (89 y.o. female)   Examination Date: 2024   :  3/28/1934 MRN: [id number]   Visit #: 4   Insurance Allowable Auth Needed   MN []Yes    [x]No    Insurance: Payor: [employer] MEDICARE / Plan: CIGNA PREFERRED MEDICARE HMO / Product Type: *No Product type* /   Insurance ID: [id number] - (Medicare Managed)  Secondary Insurance (if applicable):    Treatment Diagnosis: M54.2 Neck Pain  No diagnosis found.     Medical Diagnosis:  Hand numbness [R20.0]  Neck pain [M54.2]   Referring Physician: Rhea Oneal,*  PCP: Cole aMchado MD     Plan of care signed (Y/N):     Date of Patient follow up with Physician:      Progress Report/POC: EVAL today  POC update due: (10 visits /OR AUTH LIMITS, whichever is less) 24                                            Precautions/ Contra-indications:           Latex allergy:  NO  Pacemaker:    NO  Contraindications for Manipulation: None and osteoporosis     Other:    Red Flags:  None    C-SSRS Triggered by Intake questionnaire:   Patient answered 'NO' to both behavioral questions on intake.  No further screening warranted    Preferred Language for Healthcare:   [x] English       [] other:    SUBJECTIVE EXAMINATION     Patient stated complaint:  Patient states that her neck still hurts       Test used Initial score  2024   Pain Summary VAS 0-8 5/10   Functional questionnaire Neck Disability Index 30% disability    Other:                  OBJECTIVE EXAMINATION    C/S flexion 2 fingers from chest      24  ROM/Strength: (Blank cells denote NT)    Mvmt (norm) AROM L AROM R Notes PROM L PROM R Notes     CERVICAL Flex (60) 38       Ext (70) 35       SB(45) 26 24        Rotation (80) 46 35

## 2024-08-14 ENCOUNTER — HOSPITAL ENCOUNTER (OUTPATIENT)
Dept: PHYSICAL THERAPY | Age: 89
Setting detail: THERAPIES SERIES
Discharge: HOME | End: 2024-08-14
Payer: MEDICARE

## 2024-08-14 PROCEDURE — 97140 MANUAL THERAPY 1/> REGIONS: CPT

## 2024-08-14 PROCEDURE — 97112 NEUROMUSCULAR REEDUCATION: CPT

## 2024-08-14 PROCEDURE — 97110 THERAPEUTIC EXERCISES: CPT

## 2024-08-15 NOTE — PLAN OF CARE
[facility]- Outpatient Rehabilitation and Therapy [address], [place] [address] office: [phone] fax: [phone]      Physical Therapy Re-Certification Plan of Care    Dear Rhea Oneal,*,    We had the pleasure of treating the following patient for physical therapy services at [facility].  A summary of our findings can be found in the updated assessment below.  This includes our plan of care.  If you have any questions or concerns regarding these findings, please do not hesitate to contact me at the office phone number checked above.  Thank you for the referral.     Physician Signature:________________________________Date:__________________  By signing above (or electronic signature), therapist’s plan is approved by physician      Overall Response to Treatment:   [x]Patient is responding well to treatment and improvement is noted with regards  to goals   []Patient should continue to improve in reasonable time if they continue HEP   []Patient has plateaued and is no longer responding to skilled PT intervention    []Patient is getting worse and would benefit from return to referring MD   []Patient unable to adhere to initial POC   [x]Other: Patient has show improvement since initial plan of care with less pain, improvedc ervical spine ROM and postural strength improvingher ability to sleep, drive and perform self-care and ADL's. Symptoms still remain in her hands and has been unchanged. Patient continues to display deficits in cervical spine mobility warranting continued POC however did refer patient out to Lucy Polo for further assessment of bilateral hand numbness. Cont PT             Physical Therapy: TREATMENT/PROGRESS NOTE   Patient: Kimberly Dias (89 y.o. female)   Examination Date: 2024   :  3/28/1934 MRN: [id number]   Visit #: 5   Insurance Allowable Auth Needed   [place] []Yes    [x]No    Insurance: Payor: CIGNA MEDICARE /

## 2024-08-21 ENCOUNTER — TELEPHONE (OUTPATIENT)
Dept: FAMILY MEDICINE CLINIC | Age: 89
End: 2024-08-21

## 2024-08-21 NOTE — TELEPHONE ENCOUNTER
Pt's daughter does not want to be called and believes her mom is more than capable of making her own decisions regarding her health

## 2024-08-23 ENCOUNTER — OFFICE VISIT (OUTPATIENT)
Dept: FAMILY MEDICINE CLINIC | Age: 89
End: 2024-08-23

## 2024-08-23 VITALS
BODY MASS INDEX: 24.29 KG/M2 | SYSTOLIC BLOOD PRESSURE: 120 MMHG | DIASTOLIC BLOOD PRESSURE: 68 MMHG | OXYGEN SATURATION: 98 % | HEIGHT: 62 IN | TEMPERATURE: 98.2 F | HEART RATE: 68 BPM | WEIGHT: 132 LBS

## 2024-08-23 DIAGNOSIS — R41.3 MEMORY CHANGES: Primary | ICD-10-CM

## 2024-08-23 DIAGNOSIS — R20.0 HAND NUMBNESS: ICD-10-CM

## 2024-08-23 DIAGNOSIS — M54.2 NECK PAIN: ICD-10-CM

## 2024-08-23 PROBLEM — G30.9 ALZHEIMER'S DISEASE, UNSPECIFIED (CODE) (HCC): Status: RESOLVED | Noted: 2024-08-23 | Resolved: 2024-08-23

## 2024-08-23 PROBLEM — G30.9 ALZHEIMER'S DISEASE, UNSPECIFIED (CODE) (HCC): Status: ACTIVE | Noted: 2024-08-23

## 2024-08-23 RX ORDER — MEMANTINE HYDROCHLORIDE 5 MG/1
5 TABLET ORAL DAILY
Qty: 30 TABLET | Refills: 2 | Status: SHIPPED | OUTPATIENT
Start: 2024-08-23

## 2024-08-23 ASSESSMENT — PATIENT HEALTH QUESTIONNAIRE - PHQ9
9. THOUGHTS THAT YOU WOULD BE BETTER OFF DEAD, OR OF HURTING YOURSELF: NOT AT ALL
2. FEELING DOWN, DEPRESSED OR HOPELESS: NOT AT ALL
5. POOR APPETITE OR OVEREATING: NOT AT ALL
7. TROUBLE CONCENTRATING ON THINGS, SUCH AS READING THE NEWSPAPER OR WATCHING TELEVISION: NOT AT ALL
1. LITTLE INTEREST OR PLEASURE IN DOING THINGS: NOT AT ALL
SUM OF ALL RESPONSES TO PHQ QUESTIONS 1-9: 0
SUM OF ALL RESPONSES TO PHQ QUESTIONS 1-9: 0
10. IF YOU CHECKED OFF ANY PROBLEMS, HOW DIFFICULT HAVE THESE PROBLEMS MADE IT FOR YOU TO DO YOUR WORK, TAKE CARE OF THINGS AT HOME, OR GET ALONG WITH OTHER PEOPLE: NOT DIFFICULT AT ALL
6. FEELING BAD ABOUT YOURSELF - OR THAT YOU ARE A FAILURE OR HAVE LET YOURSELF OR YOUR FAMILY DOWN: NOT AT ALL
8. MOVING OR SPEAKING SO SLOWLY THAT OTHER PEOPLE COULD HAVE NOTICED. OR THE OPPOSITE, BEING SO FIGETY OR RESTLESS THAT YOU HAVE BEEN MOVING AROUND A LOT MORE THAN USUAL: NOT AT ALL
SUM OF ALL RESPONSES TO PHQ9 QUESTIONS 1 & 2: 0
SUM OF ALL RESPONSES TO PHQ QUESTIONS 1-9: 0
4. FEELING TIRED OR HAVING LITTLE ENERGY: NOT AT ALL
SUM OF ALL RESPONSES TO PHQ QUESTIONS 1-9: 0

## 2024-08-23 ASSESSMENT — ANXIETY QUESTIONNAIRES
7. FEELING AFRAID AS IF SOMETHING AWFUL MIGHT HAPPEN: NOT AT ALL
4. TROUBLE RELAXING: NOT AT ALL
IF YOU CHECKED OFF ANY PROBLEMS ON THIS QUESTIONNAIRE, HOW DIFFICULT HAVE THESE PROBLEMS MADE IT FOR YOU TO DO YOUR WORK, TAKE CARE OF THINGS AT HOME, OR GET ALONG WITH OTHER PEOPLE: NOT DIFFICULT AT ALL
5. BEING SO RESTLESS THAT IT IS HARD TO SIT STILL: NOT AT ALL
GAD7 TOTAL SCORE: 0
6. BECOMING EASILY ANNOYED OR IRRITABLE: NOT AT ALL
3. WORRYING TOO MUCH ABOUT DIFFERENT THINGS: NOT AT ALL
1. FEELING NERVOUS, ANXIOUS, OR ON EDGE: NOT AT ALL
2. NOT BEING ABLE TO STOP OR CONTROL WORRYING: NOT AT ALL

## 2024-08-23 NOTE — PROGRESS NOTES
Kimberly Dias (:  3/28/1934) is a 89 y.o. female,Established patient, here for evaluation of the following chief complaint(s):  Numbness (Fingers    both  hands) and Neck Pain       Assessment & Plan  Memory changes    Stable;  Patient doing well on namenda.  Patient does not want to increase dose.    Orders:    memantine (NAMENDA) 5 MG tablet; Take 1 tablet by mouth daily    Hand numbness    Not progressing;  Reviewed MRI results with patient.  Patient declines further w/u.         Neck pain    Stable;  PT has helped with patient and mobility.  Continue recommendations from PT.  See above.           Return in about 6 months (around 2025).       Subjective   HPI  Some days are better than others   is still fragile  Hand numbness continues  Mobility of neck is better  Posture is better  Does not want surgery or to discuss with neurosurgery/ Lucy Polo; does not want blood work    Memory changes  Off and on it is good  Wants to continue namenda- same dose    Review of Systems       Objective   Physical Exam  Vitals reviewed.   Constitutional:       Appearance: Normal appearance.   HENT:      Head: Normocephalic.      Right Ear: External ear normal.      Left Ear: External ear normal.      Nose: Nose normal.   Cardiovascular:      Rate and Rhythm: Normal rate and regular rhythm.      Heart sounds: Normal heart sounds, S1 normal and S2 normal.   Pulmonary:      Effort: Pulmonary effort is normal.      Breath sounds: Normal breath sounds and air entry.   Musculoskeletal:      Cervical back: No tenderness. Normal range of motion.   Neurological:      Mental Status: She is alert.   Psychiatric:         Mood and Affect: Mood normal.       An electronic signature was used to authenticate this note.    --Rhea Oneal, GEOVANI - CNP

## 2024-12-05 DIAGNOSIS — R41.3 MEMORY CHANGES: ICD-10-CM

## 2024-12-05 RX ORDER — MEMANTINE HYDROCHLORIDE 5 MG/1
5 TABLET ORAL DAILY
Qty: 30 TABLET | Refills: 2 | Status: SHIPPED | OUTPATIENT
Start: 2024-12-05

## 2024-12-05 NOTE — TELEPHONE ENCOUNTER
Medication:   Requested Prescriptions     Pending Prescriptions Disp Refills    memantine (NAMENDA) 5 MG tablet 30 tablet 2     Sig: Take 1 tablet by mouth daily       Last Filled:  8/23/24    Patient Phone Number: [phone] (home)     Last appt: 8/23/2024   Next appt: Visit date not found    Last Labs DM:   Lab Results   Component Value Date/Time    LABA1C 5.7 06/08/2014 12:00 AM     Last Lipid:   Lab Results   Component Value Date/Time    CHOL 156 06/18/2024 10:23 AM    TRIG 69 06/18/2024 10:23 AM    HDL 50 06/18/2024 10:23 AM     Last PSA: No results found for: \"PSA\"  Last Thyroid:   Lab Results   Component Value Date/Time    TSH 2.36 06/18/2024 10:23 AM

## 2025-02-03 DIAGNOSIS — I10 ESSENTIAL HYPERTENSION: ICD-10-CM

## 2025-02-03 RX ORDER — AMLODIPINE BESYLATE 10 MG/1
10 TABLET ORAL DAILY
Qty: 90 TABLET | Refills: 3 | Status: SHIPPED | OUTPATIENT
Start: 2025-02-03

## 2025-02-03 NOTE — TELEPHONE ENCOUNTER
Medication:   Requested Prescriptions     Pending Prescriptions Disp Refills    amLODIPine (NORVASC) 10 MG tablet [Pharmacy Med Name: AMLODIPINE BESYLATE TABS 10MG] 90 tablet 3     Sig: TAKE 1 TABLET DAILY       Last Filled:  2/9/24    Patient Phone Number: [phone] (home)     Last appt: 8/23/2024   Next appt: Visit date not found    Last Labs DM:   Lab Results   Component Value Date/Time    LABA1C 5.7 06/08/2014 12:00 AM     Last Lipid:   Lab Results   Component Value Date/Time    CHOL 156 06/18/2024 10:23 AM    TRIG 69 06/18/2024 10:23 AM    HDL 50 06/18/2024 10:23 AM     Last PSA: No results found for: \"PSA\"  Last Thyroid:   Lab Results   Component Value Date/Time    TSH 2.36 06/18/2024 10:23 AM

## 2025-03-13 ENCOUNTER — APPOINTMENT (OUTPATIENT)
Age: 89
End: 2025-03-13
Payer: MEDICARE

## 2025-03-13 ENCOUNTER — HOSPITAL ENCOUNTER (OUTPATIENT)
Age: 89
Setting detail: OBSERVATION
Discharge: HOME | End: 2025-03-14
Attending: EMERGENCY MEDICINE | Admitting: HOSPITALIST
Payer: MEDICARE

## 2025-03-13 DIAGNOSIS — M50.30 DDD (DEGENERATIVE DISC DISEASE), CERVICAL: ICD-10-CM

## 2025-03-13 DIAGNOSIS — H53.9 VISUAL DISTURBANCE: ICD-10-CM

## 2025-03-13 DIAGNOSIS — R91.1 PULMONARY NODULE: ICD-10-CM

## 2025-03-13 DIAGNOSIS — N30.00 ACUTE CYSTITIS WITHOUT HEMATURIA: ICD-10-CM

## 2025-03-13 DIAGNOSIS — R51.9 ACUTE NONINTRACTABLE HEADACHE, UNSPECIFIED HEADACHE TYPE: Primary | ICD-10-CM

## 2025-03-13 LAB
ALBUMIN SERPL-MCNC: 4 G/DL (ref 3.4–5)
ALBUMIN/GLOB SERPL: 1.7 {RATIO}
ALP SERPL-CCNC: 51 U/L (ref 40–129)
ALT SERPL-CCNC: 10 U/L (ref 10–40)
ANION GAP SERPL CALCULATED.3IONS-SCNC: 9 MMOL/L (ref 3–16)
AST SERPL-CCNC: 20 U/L (ref 15–37)
BACTERIA URNS QL MICRO: ABNORMAL
BASOPHILS # BLD: 0 K/UL (ref 0–0.2)
BASOPHILS NFR BLD: 0 %
BILIRUB SERPL-MCNC: 0.4 MG/DL (ref 0–1)
BILIRUB UR QL STRIP: NEGATIVE
BUN SERPL-MCNC: 22 MG/DL (ref 7–20)
CALCIUM SERPL-MCNC: 9.6 MG/DL (ref 8.3–10.6)
CHARACTER UR: ABNORMAL
CHLORIDE SERPL-SCNC: 105 MMOL/L (ref 99–110)
CLARITY UR: CLEAR
CO2 SERPL-SCNC: 28 MMOL/L (ref 21–32)
COLOR UR: YELLOW
CREAT SERPL-MCNC: 0.8 MG/DL (ref 0.6–1.2)
EKG ATRIAL RATE: 57 BPM
EKG DIAGNOSIS: NORMAL
EKG P AXIS: 71 DEGREES
EKG P-R INTERVAL: 180 MS
EKG Q-T INTERVAL: 428 MS
EKG QRS DURATION: 90 MS
EKG QTC CALCULATION (BAZETT): 416 MS
EKG R AXIS: -16 DEGREES
EKG T AXIS: 117 DEGREES
EKG VENTRICULAR RATE: 57 BPM
EOSINOPHIL # BLD: 0.34 K/UL (ref 0–0.6)
EOSINOPHILS RELATIVE PERCENT: 4 %
EPI CELLS #/AREA URNS HPF: ABNORMAL /HPF
ERYTHROCYTE [DISTWIDTH] IN BLOOD BY AUTOMATED COUNT: 12.3 % (ref 12.4–15.4)
GFR, ESTIMATED: 67 ML/MIN/1.73M2
GLUCOSE SERPL-MCNC: 100 MG/DL (ref 70–99)
GLUCOSE UR STRIP-MCNC: NEGATIVE MG/DL
HCT VFR BLD AUTO: 42.1 % (ref 36–48)
HGB BLD-MCNC: 14.3 G/DL (ref 12–16)
HGB UR QL STRIP.AUTO: NEGATIVE
INR PPP: 1.1 (ref 0.9–1.2)
KETONES UR STRIP-MCNC: NEGATIVE MG/DL
LEUKOCYTE ESTERASE UR QL STRIP: ABNORMAL
LYMPHOCYTES NFR BLD: 2.13 K/UL (ref 1–5.1)
LYMPHOCYTES RELATIVE PERCENT: 25 %
MCH RBC QN AUTO: 35 PG (ref 26–34)
MCHC RBC AUTO-ENTMCNC: 34 G/DL (ref 31–36)
MCV RBC AUTO: 103.2 FL (ref 80–100)
MONOCYTES NFR BLD: 0.43 K/UL (ref 0–1.3)
MONOCYTES NFR BLD: 5 %
NEUTROPHILS NFR BLD: 66 %
NEUTS SEG NFR BLD: 5.61 K/UL (ref 1.7–7.7)
NITRITE UR QL STRIP: POSITIVE
PH UR STRIP: 6 [PH] (ref 5–8)
PLATELET # BLD AUTO: 291 K/UL (ref 135–450)
PLATELET ESTIMATE: NORMAL
PMV BLD AUTO: 10 FL (ref 9.4–12.4)
POTASSIUM SERPL-SCNC: 3.8 MMOL/L (ref 3.5–5.1)
PROT SERPL-MCNC: 6.4 G/DL (ref 6.4–8.2)
PROT UR STRIP-MCNC: NEGATIVE MG/DL
PROTHROMBIN TIME: 14.8 SEC (ref 11.9–14.9)
RBC # BLD AUTO: 4.08 M/UL (ref 4–5.2)
RBC # BLD: NORMAL 10*6/UL
RBC #/AREA URNS HPF: ABNORMAL /HPF
SODIUM SERPL-SCNC: 142 MMOL/L (ref 136–145)
SP GR UR STRIP: 1.01 (ref 1–1.03)
UROBILINOGEN UR STRIP-ACNC: 0.2 EU/DL (ref 0–1)
WBC # BLD: NORMAL 10*3/UL
WBC #/AREA URNS HPF: ABNORMAL /HPF
WBC OTHER # BLD: 8.6 K/UL (ref 4–11)

## 2025-03-13 PROCEDURE — 70551 MRI BRAIN STEM W/O DYE: CPT

## 2025-03-13 PROCEDURE — 93005 ELECTROCARDIOGRAM TRACING: CPT

## 2025-03-13 PROCEDURE — G0378 HOSPITAL OBSERVATION PER HR: HCPCS

## 2025-03-13 PROCEDURE — 93010 ELECTROCARDIOGRAM REPORT: CPT | Performed by: INTERNAL MEDICINE

## 2025-03-13 PROCEDURE — 70496 CT ANGIOGRAPHY HEAD: CPT

## 2025-03-13 PROCEDURE — 96374 THER/PROPH/DIAG INJ IV PUSH: CPT

## 2025-03-13 PROCEDURE — 72125 CT NECK SPINE W/O DYE: CPT

## 2025-03-13 PROCEDURE — 70450 CT HEAD/BRAIN W/O DYE: CPT

## 2025-03-13 PROCEDURE — 85610 PROTHROMBIN TIME: CPT

## 2025-03-13 PROCEDURE — 6360000004 HC RX CONTRAST MEDICATION: Performed by: EMERGENCY MEDICINE

## 2025-03-13 PROCEDURE — 85025 COMPLETE CBC W/AUTO DIFF WBC: CPT

## 2025-03-13 PROCEDURE — 87086 URINE CULTURE/COLONY COUNT: CPT

## 2025-03-13 PROCEDURE — 93005 ELECTROCARDIOGRAM TRACING: CPT | Performed by: EMERGENCY MEDICINE

## 2025-03-13 PROCEDURE — 96375 TX/PRO/DX INJ NEW DRUG ADDON: CPT

## 2025-03-13 PROCEDURE — 87077 CULTURE AEROBIC IDENTIFY: CPT

## 2025-03-13 PROCEDURE — 96372 THER/PROPH/DIAG INJ SC/IM: CPT

## 2025-03-13 PROCEDURE — 96361 HYDRATE IV INFUSION ADD-ON: CPT

## 2025-03-13 PROCEDURE — 2580000003 HC RX 258: Performed by: EMERGENCY MEDICINE

## 2025-03-13 PROCEDURE — 99285 EMERGENCY DEPT VISIT HI MDM: CPT

## 2025-03-13 PROCEDURE — 2500000003 HC RX 250 WO HCPCS: Performed by: EMERGENCY MEDICINE

## 2025-03-13 PROCEDURE — 6360000002 HC RX W HCPCS: Performed by: EMERGENCY MEDICINE

## 2025-03-13 PROCEDURE — 6360000002 HC RX W HCPCS: Performed by: NURSE PRACTITIONER

## 2025-03-13 PROCEDURE — 80053 COMPREHEN METABOLIC PANEL: CPT

## 2025-03-13 PROCEDURE — 81001 URINALYSIS AUTO W/SCOPE: CPT

## 2025-03-13 PROCEDURE — 72141 MRI NECK SPINE W/O DYE: CPT

## 2025-03-13 PROCEDURE — 6370000000 HC RX 637 (ALT 250 FOR IP): Performed by: NURSE PRACTITIONER

## 2025-03-13 PROCEDURE — 6370000000 HC RX 637 (ALT 250 FOR IP): Performed by: EMERGENCY MEDICINE

## 2025-03-13 RX ORDER — ENOXAPARIN SODIUM 100 MG/ML
40 INJECTION SUBCUTANEOUS DAILY
Status: DISCONTINUED | OUTPATIENT
Start: 2025-03-13 | End: 2025-03-14 | Stop reason: HOSPADM

## 2025-03-13 RX ORDER — SODIUM CHLORIDE 0.9 % (FLUSH) 0.9 %
5-40 SYRINGE (ML) INJECTION EVERY 12 HOURS SCHEDULED
Status: DISCONTINUED | OUTPATIENT
Start: 2025-03-13 | End: 2025-03-14 | Stop reason: HOSPADM

## 2025-03-13 RX ORDER — 0.9 % SODIUM CHLORIDE 0.9 %
1000 INTRAVENOUS SOLUTION INTRAVENOUS ONCE
Status: COMPLETED | OUTPATIENT
Start: 2025-03-13 | End: 2025-03-13

## 2025-03-13 RX ORDER — ATORVASTATIN CALCIUM 20 MG/1
20 TABLET, FILM COATED ORAL NIGHTLY
Status: DISCONTINUED | OUTPATIENT
Start: 2025-03-13 | End: 2025-03-14 | Stop reason: HOSPADM

## 2025-03-13 RX ORDER — ACETAMINOPHEN 500 MG
1000 TABLET ORAL ONCE
Status: COMPLETED | OUTPATIENT
Start: 2025-03-13 | End: 2025-03-13

## 2025-03-13 RX ORDER — AMLODIPINE BESYLATE 5 MG/1
10 TABLET ORAL DAILY
Status: DISCONTINUED | OUTPATIENT
Start: 2025-03-13 | End: 2025-03-14 | Stop reason: HOSPADM

## 2025-03-13 RX ORDER — DIPHENHYDRAMINE HYDROCHLORIDE 50 MG/ML
25 INJECTION, SOLUTION INTRAMUSCULAR; INTRAVENOUS ONCE
Status: COMPLETED | OUTPATIENT
Start: 2025-03-13 | End: 2025-03-13

## 2025-03-13 RX ORDER — ASPIRIN 81 MG/1
81 TABLET, CHEWABLE ORAL DAILY
Status: DISCONTINUED | OUTPATIENT
Start: 2025-03-13 | End: 2025-03-14 | Stop reason: HOSPADM

## 2025-03-13 RX ORDER — SODIUM CHLORIDE 9 MG/ML
INJECTION, SOLUTION INTRAVENOUS PRN
Status: DISCONTINUED | OUTPATIENT
Start: 2025-03-13 | End: 2025-03-14 | Stop reason: HOSPADM

## 2025-03-13 RX ORDER — SODIUM CHLORIDE 0.9 % (FLUSH) 0.9 %
5-40 SYRINGE (ML) INJECTION PRN
Status: DISCONTINUED | OUTPATIENT
Start: 2025-03-13 | End: 2025-03-14 | Stop reason: HOSPADM

## 2025-03-13 RX ORDER — CITALOPRAM HYDROBROMIDE 20 MG/1
20 TABLET ORAL DAILY
Status: DISCONTINUED | OUTPATIENT
Start: 2025-03-13 | End: 2025-03-14 | Stop reason: HOSPADM

## 2025-03-13 RX ORDER — IOPAMIDOL 755 MG/ML
75 INJECTION, SOLUTION INTRAVASCULAR
Status: COMPLETED | OUTPATIENT
Start: 2025-03-13 | End: 2025-03-13

## 2025-03-13 RX ORDER — DORZOLAMIDE HYDROCHLORIDE AND TIMOLOL MALEATE 20; 5 MG/ML; MG/ML
1 SOLUTION/ DROPS OPHTHALMIC 2 TIMES DAILY
Status: DISCONTINUED | OUTPATIENT
Start: 2025-03-13 | End: 2025-03-13 | Stop reason: SDUPTHER

## 2025-03-13 RX ORDER — TIMOLOL MALEATE 5 MG/ML
1 SOLUTION/ DROPS OPHTHALMIC 2 TIMES DAILY
Status: DISCONTINUED | OUTPATIENT
Start: 2025-03-13 | End: 2025-03-14 | Stop reason: HOSPADM

## 2025-03-13 RX ORDER — POLYETHYLENE GLYCOL 3350 17 G/17G
17 POWDER, FOR SOLUTION ORAL DAILY PRN
Status: DISCONTINUED | OUTPATIENT
Start: 2025-03-13 | End: 2025-03-14 | Stop reason: HOSPADM

## 2025-03-13 RX ORDER — ONDANSETRON 2 MG/ML
4 INJECTION INTRAMUSCULAR; INTRAVENOUS EVERY 6 HOURS PRN
Status: DISCONTINUED | OUTPATIENT
Start: 2025-03-13 | End: 2025-03-14 | Stop reason: HOSPADM

## 2025-03-13 RX ORDER — MEMANTINE HYDROCHLORIDE 5 MG/1
5 TABLET ORAL DAILY
Status: DISCONTINUED | OUTPATIENT
Start: 2025-03-13 | End: 2025-03-14 | Stop reason: HOSPADM

## 2025-03-13 RX ORDER — LISINOPRIL 20 MG/1
20 TABLET ORAL NIGHTLY
Status: DISCONTINUED | OUTPATIENT
Start: 2025-03-13 | End: 2025-03-14 | Stop reason: HOSPADM

## 2025-03-13 RX ORDER — ASPIRIN 300 MG/1
300 SUPPOSITORY RECTAL DAILY
Status: DISCONTINUED | OUTPATIENT
Start: 2025-03-13 | End: 2025-03-14 | Stop reason: HOSPADM

## 2025-03-13 RX ORDER — DORZOLAMIDE HCL 20 MG/ML
1 SOLUTION/ DROPS OPHTHALMIC 2 TIMES DAILY
Status: DISCONTINUED | OUTPATIENT
Start: 2025-03-13 | End: 2025-03-14 | Stop reason: HOSPADM

## 2025-03-13 RX ORDER — ONDANSETRON 4 MG/1
4 TABLET, ORALLY DISINTEGRATING ORAL EVERY 8 HOURS PRN
Status: DISCONTINUED | OUTPATIENT
Start: 2025-03-13 | End: 2025-03-14 | Stop reason: HOSPADM

## 2025-03-13 RX ORDER — METOCLOPRAMIDE HYDROCHLORIDE 5 MG/ML
10 INJECTION INTRAMUSCULAR; INTRAVENOUS ONCE
Status: COMPLETED | OUTPATIENT
Start: 2025-03-13 | End: 2025-03-13

## 2025-03-13 RX ORDER — HYDROCHLOROTHIAZIDE 25 MG/1
12.5 TABLET ORAL
Status: DISCONTINUED | OUTPATIENT
Start: 2025-03-14 | End: 2025-03-14 | Stop reason: HOSPADM

## 2025-03-13 RX ADMIN — DORZOLAMIDE HCL 1 DROP: 20 SOLUTION/ DROPS OPHTHALMIC at 21:42

## 2025-03-13 RX ADMIN — IOPAMIDOL 75 ML: 755 INJECTION, SOLUTION INTRAVENOUS at 13:16

## 2025-03-13 RX ADMIN — ASPIRIN 81 MG: 81 TABLET, CHEWABLE ORAL at 18:03

## 2025-03-13 RX ADMIN — LISINOPRIL 20 MG: 20 TABLET ORAL at 21:42

## 2025-03-13 RX ADMIN — MEMANTINE 5 MG: 5 TABLET ORAL at 18:03

## 2025-03-13 RX ADMIN — ACETAMINOPHEN 1000 MG: 500 TABLET ORAL at 12:26

## 2025-03-13 RX ADMIN — ENOXAPARIN SODIUM 40 MG: 100 INJECTION SUBCUTANEOUS at 18:03

## 2025-03-13 RX ADMIN — WATER 1000 MG: 1 INJECTION INTRAMUSCULAR; INTRAVENOUS; SUBCUTANEOUS at 16:41

## 2025-03-13 RX ADMIN — ATORVASTATIN CALCIUM 20 MG: 20 TABLET, FILM COATED ORAL at 21:42

## 2025-03-13 RX ADMIN — AMLODIPINE BESYLATE 10 MG: 5 TABLET ORAL at 18:03

## 2025-03-13 RX ADMIN — DIPHENHYDRAMINE HYDROCHLORIDE 25 MG: 50 INJECTION INTRAMUSCULAR; INTRAVENOUS at 12:25

## 2025-03-13 RX ADMIN — CITALOPRAM HYDROBROMIDE 20 MG: 20 TABLET ORAL at 18:03

## 2025-03-13 RX ADMIN — METOCLOPRAMIDE 10 MG: 5 INJECTION, SOLUTION INTRAMUSCULAR; INTRAVENOUS at 12:26

## 2025-03-13 RX ADMIN — SODIUM CHLORIDE 1000 ML: 0.9 INJECTION, SOLUTION INTRAVENOUS at 12:24

## 2025-03-13 ASSESSMENT — ENCOUNTER SYMPTOMS
SHORTNESS OF BREATH: 0
ABDOMINAL PAIN: 0
COUGH: 0
NAUSEA: 0
VOMITING: 0

## 2025-03-13 ASSESSMENT — PAIN SCALES - GENERAL
PAINLEVEL_OUTOF10: 0
PAINLEVEL_OUTOF10: 4

## 2025-03-13 ASSESSMENT — PAIN DESCRIPTION - ORIENTATION: ORIENTATION: LEFT

## 2025-03-13 ASSESSMENT — PAIN - FUNCTIONAL ASSESSMENT: PAIN_FUNCTIONAL_ASSESSMENT: 0-10

## 2025-03-13 ASSESSMENT — PAIN DESCRIPTION - LOCATION: LOCATION: HEAD

## 2025-03-13 NOTE — PROGRESS NOTES
4 Eyes Skin Assessment     NAME:  Kimberly Dias  YOB: 1934  MEDICAL RECORD NUMBER:  [id number]    The patient is being assessed for  Admission    I agree that at least one RN has performed a thorough Head to Toe Skin Assessment on the patient. ALL assessment sites listed below have been assessed.      Areas assessed by both nurses:    Head, Face, Ears, Shoulders, Back, Chest, Arms, Elbows, Hands, Sacrum. Buttock, Coccyx, Ischium, Legs. Feet and Heels, and Under Medical Devices         Does the Patient have a Wound? No noted wound(s)       Marc Prevention initiated by RN: No  Wound Care Orders initiated by RN: No    Pressure Injury (Stage 3,4, Unstageable, DTI, NWPT, and Complex wounds) if present, place Wound referral order by RN under : No    New Ostomies, if present place, Ostomy referral order under : No     Nurse 1 eSignature: Electronically signed by SANDY MELENDEZ RN on 3/13/25 at 7:42 PM EDT    **SHARE this note so that the co-signing nurse can place an eSignature**    Nurse 2 eSignature: Electronically signed by Monica Palomino RN on 3/13/25 at 8:09 PM EDT

## 2025-03-13 NOTE — ED PROVIDER NOTES
[facility] Emergency Department [facility], Jorge Harp MD, am the primary clinician of record.    CHIEF COMPLAINT  Chief Complaint   Patient presents with    Headache     Headache from inner head, over eye, and down to clavicle         HISTORY OF PRESENT ILLNESS  Kimberly Dias is a 89 y.o. female  who presents to the ED complaining of headache from the top of the head to the neck.  Headache has been ongoing since yesterday into last night and today.  No syncope, no dizziness, no numbness or weakness focally.  No fevers.  No neck rigidity.  No speech or swallowing issue, no blurry or double vision although at one point while in the waiting room she did have some visual distortion where one eye makes objects seem bigger than the other.  No CP cough SOB.  No headaches like this in the past.  Rates the pain currently about a 4-5 out of 10 but turning to the left makes it worse.    No other complaints, modifying factors or associated symptoms.     I have reviewed the following from the nursing documentation.    Past Medical History:   Diagnosis Date    Bradycardia     Cerumen impaction     Depression     Glaucoma     Hypertension     Osteoarthritis      Past Surgical History:   Procedure Laterality Date    APPENDECTOMY      BREAST LUMPECTOMY      HYSTERECTOMY (CERVIX STATUS UNKNOWN)  2004    KNEE SURGERY Left      Family History   Problem Relation Age of Onset    Stroke Father     High Blood Pressure Father     Stroke Brother     High Blood Pressure Brother     Alzheimer's Disease Mother     High Blood Pressure Mother     Diabetes Mother      Social History     Socioeconomic History    Marital status:      Spouse name: Not on file    Number of children: Not on file    Years of education: Not on file    Highest education level: Not on file   Occupational History    Not on file   Tobacco Use    Smoking status: Never    Smokeless tobacco: Never   Substance and Sexual Activity    Alcohol use: No

## 2025-03-13 NOTE — ED TRIAGE NOTES
Pt reporting headache from middle of head down left side of neck to clavicle. Reports after taking home medication last night pain has calmed down to 4/10 but at its worst was 7/10. Patient reports some vision changes in left eye, no facial droop, slurred speech, or neuro deficits. Pt AO4, no history of stroke or falls.

## 2025-03-13 NOTE — H&P
Transportation     Lack of Transportation (Non-Medical): No   Physical Activity: Inactive (6/17/2024)    Exercise Vital Sign     Days of Exercise per Week: 0 days     Minutes of Exercise per Session: 0 min   Housing Stability: Unknown (6/17/2024)    Housing Stability Vital Sign     Unstable Housing in the Last Year: No       Social and family history reviewed with patient/family.     Medications:     Home Medication   Prior to Admission medications    Medication Sig Start Date End Date Taking? Authorizing Provider   amLODIPine (NORVASC) 10 MG tablet TAKE 1 TABLET DAILY 2/3/25   Rhea Oneal APRN - CNP   memantine (NAMENDA) 5 MG tablet Take 1 tablet by mouth daily 12/5/24   Rhea Oneal APRN - CNP   lisinopril (PRINIVIL;ZESTRIL) 20 MG tablet TAKE 1 TABLET EVERY MORNING AND 1 TABLET EVERY NIGHT AT BEDTIME 8/7/24   Cole Machado MD   hydroCHLOROthiazide 12.5 MG capsule TAKE 1 CAPSULE EVERY MORNING 8/7/24   Cole Machado MD   citalopram (CELEXA) 20 MG tablet TAKE 1 TABLET DAILY 8/7/24   Cole Machado MD   Probiotic Product (PROBIOTIC-10 PO) Take by mouth    Celeste Bean MD   Docusate Calcium (STOOL SOFTENER PO) Take by mouth    Celeste Bean MD   vitamin D (CHOLECALCIFEROL) 1000 units TABS tablet Take 1 tablet by mouth daily    Celeste Bean MD   Naproxen Sodium (ALEVE PO) Take by mouth 2 times daily    Celeste Bean MD   Bioflavonoid Products (BIOFLEX PO) Take by mouth daily    Celeste Bean MD   DORZOLAMIDE HCL-TIMOLOL MAL OP Apply to eye 1 drop every 12 hours    Celeste Bean MD   Flaxseed, Linseed, (FLAXSEED OIL) 1000 MG CAPS Take  by mouth.    Celeste Bean MD   Multiple Vitamins-Minerals (THERAPEUTIC MULTIVITAMIN-MINERALS) tablet Take 1 tablet by mouth daily    Celeste Bean MD   Ascorbic Acid (VITAMIN C) 500 MG tablet Take 2 tablets by mouth daily    Celeste Bean MD     Medications:    Infusions:   PRN  Meds:     Recent Labs     03/13/25  1215   WBC 8.6   HGB 14.3   HCT 42.1         Recent Labs     03/13/25  1215      K 3.8      CO2 28   BUN 22*   CREATININE 0.8     Recent Labs     03/13/25  1215   AST 20   ALT 10   BILITOT 0.4   ALKPHOS 51     Recent Labs     03/13/25  1245   INR 1.1     No results for input(s): \"CKTOTAL\", \"CKMB\", \"CKMBINDEX\", \"TROPONINT\" in the last 72 hours.     Imaging reviewed  CTA HEAD NECK W CONTRAST  Result Date: 3/13/2025  CTA HEAD NECK W CONTRAST Indication: headache, neck pain, worse when looking left Comparison: None Technique: Individualized dose optimization technique was used in order to meet ALARA standards for radiation dose reduction.  In addition to vendor specific dose reduction algorithms, the dose reduction techniques vary based on the specific scanner utilized but frequently include automated exposure control, adjustment of the mA and/or kV according to patient size, and use of iterative reconstruction technique.. Head and neck CTA was performed with IV contrast. Multiplanar MIP reconstructions were created. 75 mL of Isovue-300 IV. Quantitative stenosis measurements were calculated on the basis of diameter stenosis, per NASCET criteria. 3-dimensional vascular reconstructions were provided for review Findings:   CTA NECK: There is a 3 vessel aortic arch. The origins of the great vessels are patent without hemodynamically significant stenosis. Common carotid arteries are patent and normal in caliber. Right Internal Carotid Artery: The carotid bulb is unremarkable. No significant stenosis or occlusion. No intimal flap. Left Internal Carotid Artery: The carotid bulb is unremarkable. No significant stenosis or occlusion. No intimal flap. The vertebral arteries arise from the subclavian arteries. There is no hemodynamically significant stenosis or occlusion of the vertebral arteries. Lung apices demonstrate respiratory motion artifact. There are multilevel

## 2025-03-13 NOTE — ED NOTES
ED to Inpatient Handoff SBAR    Patient Name: Kimberly Dias   :  3/28/1934  89 y.o.   MRN:  [id number]  Preferred Name  Kimberly Kern  ED Room #:    Family/Caregiver Present yes     Chief Complaint Headache (Headache from inner head, over eye, and down to clavicle )       Restraints no   Sitter no   Sepsis Risk Score    Isolation No active isolations   Fall Risk Assessment Presents to emergency department  because of falls (Syncope, seizure, or loss of consciousness): No, Age > 70: No, Altered Mental Status, Intoxication with alcohol or substance confusion (Disorientation, impaired judgment, poor safety awaremess, or inability to follow instructions): No, Impaired Mobility: Ambulates or transfers with assistive devices or assistance; Unable to ambulate or transer.: No, Nursing Judgement: No     Situation  Code Status: No Order No additional code details.    Allergies: Codeine and Sulfa antibiotics  Weight: No data found.  Arrived from: home  Hospital Problem/Diagnosis:  Principal Problem:    Vision changes  Resolved Problems:    * No resolved hospital problems. *    Imaging:   CT HEAD WO CONTRAST   Final Result      No acute intracranial abnormality.      Left sphenoid sinus disease      Electronically signed by Prieto Manning      CT CERVICAL SPINE WO CONTRAST   Final Result      Spondylosis in the cervical spine as detailed above.      6 mm nodule at the right lung apex. Fleischner Society guidelines recommendation for nodule size between 6 to 8 mm:   Low risk patient: Initial follow-up CT at 6-12 months, then at 18-24 months if no change.   High-risk patient: Initial follow-up CT at 3-6 months, then at 9-12 months, and 24 months if no change.      Electronically signed by Jacques Perez DO      CTA HEAD NECK W CONTRAST   Final Result      1.  No acute vascular abnormality. Specifically, no large vessel occlusion or   flow-limiting stenosis.      2.  Left sphenoid sinus disease      Electronically signed by

## 2025-03-14 VITALS
RESPIRATION RATE: 16 BRPM | OXYGEN SATURATION: 97 % | TEMPERATURE: 97.7 F | BODY MASS INDEX: 23.74 KG/M2 | WEIGHT: 129 LBS | HEART RATE: 57 BPM | SYSTOLIC BLOOD PRESSURE: 149 MMHG | DIASTOLIC BLOOD PRESSURE: 66 MMHG | HEIGHT: 62 IN

## 2025-03-14 LAB
ANION GAP SERPL CALCULATED.3IONS-SCNC: 9 MMOL/L (ref 3–16)
BUN SERPL-MCNC: 20 MG/DL (ref 7–20)
CALCIUM SERPL-MCNC: 8.4 MG/DL (ref 8.3–10.6)
CHLORIDE SERPL-SCNC: 107 MMOL/L (ref 99–110)
CHOLEST SERPL-MCNC: 139 MG/DL
CO2 SERPL-SCNC: 26 MMOL/L (ref 21–32)
CREAT SERPL-MCNC: 0.8 MG/DL (ref 0.6–1.2)
CRP SERPL HS-MCNC: <0.3 MG/L (ref 0–5.1)
ERYTHROCYTE [DISTWIDTH] IN BLOOD BY AUTOMATED COUNT: 12.3 % (ref 12.4–15.4)
ERYTHROCYTE [SEDIMENTATION RATE] IN BLOOD BY WESTERGREN METHOD: 1 MM/HR (ref 0–30)
EST. AVERAGE GLUCOSE BLD GHB EST-MCNC: 111 MG/DL
GFR, ESTIMATED: 71 ML/MIN/1.73M2
GLUCOSE SERPL-MCNC: 88 MG/DL (ref 70–99)
HBA1C MFR BLD: 5.5 %
HCT VFR BLD AUTO: 41.6 % (ref 36–48)
HDLC SERPL-MCNC: 41 MG/DL
HGB BLD-MCNC: 14.3 G/DL (ref 12–16)
LDLC SERPL CALC-MCNC: 89 MG/DL (ref 0–130)
MCH RBC QN AUTO: 35.2 PG (ref 26–34)
MCHC RBC AUTO-ENTMCNC: 34.4 G/DL (ref 31–36)
MCV RBC AUTO: 102.5 FL (ref 80–100)
PLATELET # BLD AUTO: 281 K/UL (ref 135–450)
PMV BLD AUTO: 10.5 FL (ref 9.4–12.4)
POTASSIUM SERPL-SCNC: 3.6 MMOL/L (ref 3.5–5.1)
PROCALCITONIN SERPL-MCNC: 0.04 NG/ML (ref 0–0.15)
RBC # BLD AUTO: 4.06 M/UL (ref 4–5.2)
SODIUM SERPL-SCNC: 142 MMOL/L (ref 136–145)
TRIGL SERPL-MCNC: 47 MG/DL
WBC OTHER # BLD: 7.1 K/UL (ref 4–11)

## 2025-03-14 PROCEDURE — 80048 BASIC METABOLIC PNL TOTAL CA: CPT

## 2025-03-14 PROCEDURE — G0378 HOSPITAL OBSERVATION PER HR: HCPCS

## 2025-03-14 PROCEDURE — 85652 RBC SED RATE AUTOMATED: CPT

## 2025-03-14 PROCEDURE — 83036 HEMOGLOBIN GLYCOSYLATED A1C: CPT

## 2025-03-14 PROCEDURE — 2500000003 HC RX 250 WO HCPCS: Performed by: NURSE PRACTITIONER

## 2025-03-14 PROCEDURE — 6370000000 HC RX 637 (ALT 250 FOR IP): Performed by: NURSE PRACTITIONER

## 2025-03-14 PROCEDURE — 36415 COLL VENOUS BLD VENIPUNCTURE: CPT

## 2025-03-14 PROCEDURE — 86140 C-REACTIVE PROTEIN: CPT

## 2025-03-14 PROCEDURE — 84145 PROCALCITONIN (PCT): CPT

## 2025-03-14 PROCEDURE — 6370000000 HC RX 637 (ALT 250 FOR IP): Performed by: FAMILY MEDICINE

## 2025-03-14 PROCEDURE — 6360000002 HC RX W HCPCS: Performed by: NURSE PRACTITIONER

## 2025-03-14 PROCEDURE — 99223 1ST HOSP IP/OBS HIGH 75: CPT | Performed by: PSYCHIATRY & NEUROLOGY

## 2025-03-14 PROCEDURE — 85027 COMPLETE CBC AUTOMATED: CPT

## 2025-03-14 PROCEDURE — 97161 PT EVAL LOW COMPLEX 20 MIN: CPT

## 2025-03-14 PROCEDURE — 97165 OT EVAL LOW COMPLEX 30 MIN: CPT

## 2025-03-14 PROCEDURE — 97535 SELF CARE MNGMENT TRAINING: CPT

## 2025-03-14 PROCEDURE — 96372 THER/PROPH/DIAG INJ SC/IM: CPT

## 2025-03-14 PROCEDURE — 80061 LIPID PANEL: CPT

## 2025-03-14 RX ORDER — TRAMADOL HYDROCHLORIDE 50 MG/1
50 TABLET ORAL EVERY 6 HOURS PRN
Status: DISCONTINUED | OUTPATIENT
Start: 2025-03-14 | End: 2025-03-14 | Stop reason: HOSPADM

## 2025-03-14 RX ORDER — ACETAMINOPHEN 325 MG/1
650 TABLET ORAL EVERY 4 HOURS PRN
Status: DISCONTINUED | OUTPATIENT
Start: 2025-03-14 | End: 2025-03-14 | Stop reason: HOSPADM

## 2025-03-14 RX ORDER — GABAPENTIN 100 MG/1
200 CAPSULE ORAL 2 TIMES DAILY PRN
Qty: 60 CAPSULE | Refills: 0 | Status: SHIPPED | OUTPATIENT
Start: 2025-03-14 | End: 2025-04-13

## 2025-03-14 RX ORDER — KETOROLAC TROMETHAMINE 15 MG/ML
15 INJECTION, SOLUTION INTRAMUSCULAR; INTRAVENOUS ONCE
Status: DISCONTINUED | OUTPATIENT
Start: 2025-03-14 | End: 2025-03-14 | Stop reason: HOSPADM

## 2025-03-14 RX ORDER — CEPHALEXIN 500 MG/1
500 CAPSULE ORAL 3 TIMES DAILY
Qty: 9 CAPSULE | Refills: 0 | Status: SHIPPED | OUTPATIENT
Start: 2025-03-14 | End: 2025-03-17

## 2025-03-14 RX ADMIN — CEPHALEXIN 250 MG: 250 CAPSULE ORAL at 15:00

## 2025-03-14 RX ADMIN — HYDROCHLOROTHIAZIDE 12.5 MG: 25 TABLET ORAL at 11:00

## 2025-03-14 RX ADMIN — AMLODIPINE BESYLATE 10 MG: 5 TABLET ORAL at 10:58

## 2025-03-14 RX ADMIN — ASPIRIN 81 MG: 81 TABLET, CHEWABLE ORAL at 10:58

## 2025-03-14 RX ADMIN — Medication 10 ML: at 00:36

## 2025-03-14 RX ADMIN — ACETAMINOPHEN 650 MG: 325 TABLET ORAL at 04:02

## 2025-03-14 RX ADMIN — ENOXAPARIN SODIUM 40 MG: 100 INJECTION SUBCUTANEOUS at 10:58

## 2025-03-14 RX ADMIN — Medication 10 ML: at 10:59

## 2025-03-14 RX ADMIN — CITALOPRAM HYDROBROMIDE 20 MG: 20 TABLET ORAL at 10:58

## 2025-03-14 RX ADMIN — MEMANTINE 5 MG: 5 TABLET ORAL at 10:58

## 2025-03-14 ASSESSMENT — PAIN DESCRIPTION - DESCRIPTORS
DESCRIPTORS: SHARP
DESCRIPTORS: STABBING;PINS AND NEEDLES

## 2025-03-14 ASSESSMENT — PAIN DESCRIPTION - ORIENTATION
ORIENTATION: LEFT
ORIENTATION: LEFT

## 2025-03-14 ASSESSMENT — PAIN SCALES - GENERAL
PAINLEVEL_OUTOF10: 0
PAINLEVEL_OUTOF10: 6
PAINLEVEL_OUTOF10: 4

## 2025-03-14 ASSESSMENT — PAIN DESCRIPTION - LOCATION
LOCATION: HEAD
LOCATION: HEAD

## 2025-03-14 ASSESSMENT — PAIN - FUNCTIONAL ASSESSMENT: PAIN_FUNCTIONAL_ASSESSMENT: PREVENTS OR INTERFERES SOME ACTIVE ACTIVITIES AND ADLS

## 2025-03-14 ASSESSMENT — PAIN DESCRIPTION - FREQUENCY: FREQUENCY: INTERMITTENT

## 2025-03-14 ASSESSMENT — PAIN DESCRIPTION - ONSET: ONSET: AWAKENED FROM SLEEP

## 2025-03-14 NOTE — PROGRESS NOTES
This RN reviewed AVS with patient, verbalizing understanding of follow up care and prescription medications.  No further questions at this time. IV removed with no complications.  Removed from telemetry Patient left unit in stable condition via wheelchair with RN.  Patient discharging home via daughter's private vehicle.

## 2025-03-14 NOTE — PLAN OF CARE
Problem: Discharge Planning  Goal: Discharge to home or other facility with appropriate resources  3/14/2025 1415 by Jackie Adams RN  Outcome: Completed  3/14/2025 1415 by Jackie Adams RN  Outcome: Adequate for Discharge  3/14/2025 1414 by Jackie Adams RN  Outcome: Progressing  3/14/2025 0046 by Josefina Pichardo RN  Outcome: Progressing     Problem: Safety - Adult  Goal: Free from fall injury  3/14/2025 1415 by Jackie Adams RN  Outcome: Completed  3/14/2025 1415 by Jackie Adams RN  Outcome: Adequate for Discharge  3/14/2025 1414 by Jackie Adams RN  Outcome: Progressing  3/14/2025 0046 by Josefina Pichardo RN  Outcome: Progressing     Problem: ABCDS Injury Assessment  Goal: Absence of physical injury  3/14/2025 1415 by Jackie Adams RN  Outcome: Completed  3/14/2025 1415 by Jackie Adams RN  Outcome: Adequate for Discharge  3/14/2025 1414 by Jackie Adams RN  Outcome: Progressing  3/14/2025 0046 by Josefina Pichardo RN  Outcome: Progressing     Problem: Pain  Goal: Verbalizes/displays adequate comfort level or baseline comfort level  3/14/2025 1415 by Jackie Adams RN  Outcome: Completed  3/14/2025 1415 by Jackie Adams RN  Outcome: Adequate for Discharge  3/14/2025 1414 by Jackie Adams RN  Outcome: Progressing

## 2025-03-14 NOTE — DISCHARGE SUMMARY
Hospital Medicine Discharge Summary    Patient ID: Kimberly Dias      Patient's PCP: Rhea Oneal, GEOVANI Florian CNP    Admit Date: 3/13/2025     Discharge Date:   ***    Admitting Physician: Yokasta Gruber MD     Discharge Physician: Kimberly Fernandez, APRN - CNP     Discharge Diagnoses        Hospital Course: ***          Patient stated they felt well and wanted to go home.  Patient denied chest pain, shortness of breath, palpitations, abdominal pain, nausea vomiting, diarrhea, dysuria, headache lightheadedness or dizziness.  Appetite good.  Voiding without difficulty.  Reviewed plan of care with patient, verbalized understanding and agreement.  Denied further questions or needs.      Physical Exam Performed:     BP (!) 155/61   Pulse 60   Temp 97.7 °F (36.5 °C) (Oral)   Resp 16   Ht 1.575 m (5' 2\")   Wt 58.5 kg (129 lb)   SpO2 97%   BMI 23.59 kg/m²       General appearance:  No apparent distress, appears stated age and cooperative.  HEENT:  Normal cephalic, atraumatic without obvious deformity. Pupils equal, round.  Extra ocular muscles intact. Conjunctivae/corneas clear.  Neck: Supple, with full range of motion. No jugular venous distention. Trachea midline.  Respiratory:  Normal respiratory effort. Clear to auscultation, bilaterally without Rales/Wheezes/Rhonchi.  Cardiovascular:  Regular rate and rhythm with normal S1/S2 without murmurs, rubs or gallops.  Abdomen: Soft, non-tender, non-distended with normal bowel sounds.  Musculoskeletal:  No clubbing, cyanosis or edema bilaterally.  Full range of motion without deformity.  Skin: Skin color, texture, turgor normal.  No rashes or lesions.  Neurologic:  Face symmetrical, speech clear, moves all 4 extremities, sensations are intact bilaterally   Psychiatric:  Alert and oriented, thought content appropriate, normal insight  Capillary Refill: Brisk,< 3 seconds   Peripheral Pulses: +2 palpable, equal bilaterally       Labs: For convenience  to contact me at [phone].    NOTE:  This report was transcribed using voice recognition software.  Every effort was made to ensure accuracy; however, inadvertent computerized transcription errors may be present.

## 2025-03-14 NOTE — CONSULTS
In patient Neurology consult        [facility] Neurology      Heavenly Jacobs MD      Kimberly Dias  3/28/1934    Date of Service: 3/14/2025    Referring Physician: Yokasta Gruber MD      Reason for the consult and CC: New onset headache and visual changes     HPI:   The patient is a 89 y.o.  years old female with history of hypertension, hyperlipidemia and other medical problems who was admitted to the hospital at [facility] yesterday with above concern.  Symptoms started 2 days ago.  Description holocranial dull achy pain that radiated to the back of the head and neck.  No fever or chills.  No trauma or injury.  Some blurred vision bilaterally.  No double vision or jaw claudication.  No weakness or numbness or tingling.  No other triggers or other associated symptoms.  She came to the ED yesterday where she was admitted.  Further imaging MRI and CT showed no acute findings.  Today she is completely pain-free.  No visual changes, headache, double vision or blurred vision.  Blood pressure was elevated at 190 systolic.  No chest pain.  CTA showed no LVO.  MRI of the brain and C-spine showed no acute stroke and mild DJD.  Other review of system was unremarkable.       Constitutional:   Vitals:    03/14/25 0230 03/14/25 0929 03/14/25 1000 03/14/25 1029   BP: (!) 158/65 (!) 155/61 (!) 177/71 (!) 190/69   Pulse: 54 60 62 57   Resp: 16 16     Temp:  97.7 °F (36.5 °C)     TempSrc:  Oral     SpO2: 99% 97% 97% 97%   Weight:       Height:             I personally reviewed and updated social history, past medical history, medications, allergy, surgical history, and family history as documented in the patient's electronic health records.       ROS: 10-14 ROS reviewed with the patient/nurse/family which were unremarkable except mentioned in H&P.    General appearance:  Normal development and appear in no acute distress.   Mental Status:   Oriented to person, place, problem, and time.    Memory: Good immediate recall.

## 2025-03-14 NOTE — DISCHARGE INSTRUCTIONS
Follow up with pcp in 1 week  Referral sent to neurosurgery for advanced degenerative disc disease of neck  Take keflex for UTI  May take tylenol 1000mg every 6 hours as needed for pain  Stop taking naproxen due to slow kidney filtration.

## 2025-03-14 NOTE — PROGRESS NOTES
Speech Language Pathology  Attempt Note     Name: Kimberly Dias  : 3/28/1934  Medical Diagnosis: Visual disturbance [H53.9]  Pulmonary nodule [R91.1]  Vision changes [H53.9]  Acute cystitis without hematuria [N30.00]  Acute nonintractable headache, unspecified headache type [R51.9]      SLP attempted to see pt for bedside swallow evaluation (BSE). Order acknowledged and chart reviewed for completion of eval. Evaluation unable to be completed as pt is currently working with physical therapy. SLP to re-attempt as pt's condition and schedule allows. RN aware. No charges.    Thank you,    Sweta Jose M.A., CCC-SLP  Speech-Language Pathologist  [id number]

## 2025-03-14 NOTE — PROGRESS NOTES
[facility] - Rehabilitation Department       Occupational Therapy  4220/4220-01  [facility] 4 MED/SURG    [x] Initial Evaluation            [x] Daily Treatment Note         [x] Discharge Summary      Patient: Kimberly Dias   : 3/28/1934   MRN: [id number]   Date of Service:  3/14/2025  Discharge Recommendations: Home A/PRN      AM-PAC Inpatient Daily Activity Raw Score: 24    DME Required For Discharge: patient has all required DME for discharge    Therapy discharge recommendations take into account each patient's current medical complexities and are subject to input/oversight from the patient's healthcare team.     Barriers to Home Discharge:   [] Steps to access home entry or bed/bath:   [] Unable to transfer, ambulate, or propel wheelchair household distances without assist   [] Limited available assist at home upon discharge    [] Patient or family requests d/c to post-acute facility    [] Poor cognition/safety awareness for d/c to home alone    []Unable to maintain ordered weight bearing status    [] Patient with salient signs of long-standing immobility   [x] Patient is at risk for falls due to: decreased activity tolerance   [x] Other: decrease ADL & fx mobility    If pt is unable to be seen after this session, please let this note serve as discharge summary.  Please see case management note for discharge disposition.  Thank you.    Admitting Diagnosis:  Vision changes  Referring Physician: Kendal Monge APRN - CNP   Current Admission Summary: Kendal Monge APRN - CNP H&P 3/13:  \"Hospital Day: 1                                                                      History of Present Illness:      Chief Complaint: Vision changes           Hospital Course     Kimberly Dias is a 89 y.o.  female, with past medical history significant for depression, glaucoma, hypertension, osteoarthritis, who presented to the emergency room with complaint of headache and vision changes.   Patient states she started  nurse notified, and family/caregiver present     Education   Barriers To Learning: none  Patient Education: patient educated on goals, OT role and benefits, plan of care, ADL adaptive strategies, proper use of assistive device/equipment, adaptive device training, disease specific education, discharge recommendations  Learning Assessment: patient verbalizes and demonstrates understanding    Plan   Frequency: Eval with same day discharge.  No follow up required.  Current Treatment Recommendations: not applicable, evaluation completed with same day discharge.    Goals   Patient Goals: none stated   Short Term Goals:  Time Frame: By discharge unless noted otherwise   Patient will verbalize understanding of of self feeding & dressing DME and use - Goals met 3/14/25    Above goals reviewed on 3/14/2025.  All goals are ongoing at this time unless indicated above.       Therapy Session Time      Individual Group Co-treatment   Time In 1345      Time Out 1408      Minutes 23           Timed Code Treatment Minutes: 8 Minutes (15 min eval; 8 min tx)  Total Evaluation Minutes: 15  Total Treatment Minutes:  8       If co-treatement indicated, patient seen for co-treatment due to: requirement of two skilled therapists, patient safety, and/or cognitive status.      Electronically Signed By: Shannon Santos OT

## 2025-03-14 NOTE — PLAN OF CARE
Problem: Discharge Planning  Goal: Discharge to home or other facility with appropriate resources  3/14/2025 1414 by Jackie Adams RN  Outcome: Progressing  3/14/2025 0046 by Josefina Pichardo RN  Outcome: Progressing     Problem: Safety - Adult  Goal: Free from fall injury  3/14/2025 1414 by Jackie Adams RN  Outcome: Progressing  3/14/2025 0046 by Josefina Pichardo RN  Outcome: Progressing     Problem: ABCDS Injury Assessment  Goal: Absence of physical injury  3/14/2025 1414 by Jackie Adams RN  Outcome: Progressing  3/14/2025 0046 by Josefina Pichardo RN  Outcome: Progressing     Problem: Pain  Goal: Verbalizes/displays adequate comfort level or baseline comfort level  Outcome: Progressing

## 2025-03-14 NOTE — PLAN OF CARE
Problem: Occupational Therapy - Adult  Goal: By Discharge: Performs self-care activities at highest level of function for planned discharge setting.  See evaluation for individualized goals.  Outcome: Adequate for Discharge   Shannon Santos OTR

## 2025-03-14 NOTE — PROGRESS NOTES
[facility] - Rehabilitation Department      Physical Therapy  4220/4220-01  [facility] 4 MED/SURG    [x] Initial Evaluation            [] Daily Treatment Note         [x] Discharge Summary      Patient: Kimberly Dias   : 3/28/1934   MRN: [id number]   Date of Service:  3/14/2025   Discharge Recommendations: home   [facility] Raw Score:   AM-PAC Inpatient Mobility Raw Score : 24            DME Required For Discharge:   patient has all required DME for discharge  Therapy discharge recommendations take into account each patient's current medical complexities and are subject to input/oversight from the patient's healthcare team.   Barriers to Home Discharge:     [x] Other: requires cane   If pt is unable to be seen after this session, please let this note serve as discharge summary.  Please see case management note for discharge disposition.  Thank you.  Admitting Diagnosis: Vision changes  Ordering Physician:  RONDA Logan  Current Admission Summary:   3/13/2025 H&P RONDA Logan  \"Chief Complaint: Vision changes           Hospital Course     Kimberly Dias is a 89 y.o.  female, with past medical history significant for depression, glaucoma, hypertension, osteoarthritis, who presented to the emergency room with complaint of headache and vision changes.   Patient states she started to have a headache on the top of her head that radiated to her neck on the left side.  States headache has been ongoing over the past few days but has worsened in the last 24 hours starting last night.  She complains of left sided neck pain worse when she turns her head to the left.  She denies any dizziness or lightheadedness no syncopal episodes no numbness or tingling or focal weaknesses in her extremities.  She denies any recent illnesses, fevers or chills.  Denies any chest pain or palpitations.  States she had an MRI of her neck last year but never followed up.  She denies any recent falls or injuries.  She  states in her left eye and makes what she is looking to appear smaller.  Denies any darkness or loss of vision.  She states she has not had these vision changes in the past.  States her last eye exam was around November of December of this past year.     On Examination,the patient is able to state history and onset of symptoms.At the ED, vital signs;T 97.6 °F,HR 56, RR 18, /116 mm/hg,SPO2 96% RA  Significant laboratories were the following: BUN 22, glucose 100, UA indicative of UTI positive nitrates, positive WBCs  EKG reviewed-sinus rhythm no ischemic changes  Imaging results reviewed full reports below.  The patient was brought to the ED and was subsequently admitted for  further evaluation and management.  Patient was discussed with the emergency room provider.     In the emergency room she was treated with IV Rocephin for acute UTI, for headache she was given Tylenol, IV Benadryl, IV Reglan as well as normal saline bolus.     Review of Systems   Constitutional:  Negative for fatigue and fever.   HENT:  Negative for congestion.    Eyes:  Positive for visual disturbance.   Respiratory:  Negative for cough and shortness of breath.    Cardiovascular:  Negative for chest pain and palpitations.   Gastrointestinal:  Negative for abdominal pain, nausea and vomiting.   Genitourinary:  Negative for dysuria.   Musculoskeletal:  Positive for neck pain.   Neurological:  Positive for headaches. Negative for dizziness, facial asymmetry and weakness.   Psychiatric/Behavioral: Negative.           Assessment and Recommendations:   Kimberly Dias is a 89 y.o.  female  who presents with Vision changes     Visual disturbance  Acute non-intractable headache  Rule out TIA  LKW-1 week ago, NIHSS-1-visual field changes left eye  CT head no acute intracranial abnormality.  Left sphenoid sinus disease  CTA head and neck-no acute vascular abnormality.  No LVO  -Observation  -Obtain MRI brain  -Neurology consult  -Stroke

## 2025-03-14 NOTE — PLAN OF CARE
Problem: Discharge Planning  Goal: Discharge to home or other facility with appropriate resources  3/14/2025 1415 by Jackie Adams RN  Outcome: Adequate for Discharge  3/14/2025 1414 by Jackie Adams RN  Outcome: Progressing  3/14/2025 0046 by Josefina Pichardo RN  Outcome: Progressing     Problem: Safety - Adult  Goal: Free from fall injury  3/14/2025 1415 by Jackie Adams RN  Outcome: Adequate for Discharge  3/14/2025 1414 by Jackie Adams RN  Outcome: Progressing  3/14/2025 0046 by Josefina Pichardo RN  Outcome: Progressing     Problem: ABCDS Injury Assessment  Goal: Absence of physical injury  3/14/2025 1415 by Jackie Adams RN  Outcome: Adequate for Discharge  3/14/2025 1414 by Jackie Adams RN  Outcome: Progressing  3/14/2025 0046 by Josefina Pichardo RN  Outcome: Progressing     Problem: Pain  Goal: Verbalizes/displays adequate comfort level or baseline comfort level  3/14/2025 1415 by Jackie Adams RN  Outcome: Adequate for Discharge  3/14/2025 1414 by Jackie Adams RN  Outcome: Progressing

## 2025-03-15 LAB
MICROORGANISM SPEC CULT: ABNORMAL
SPECIMEN DESCRIPTION: ABNORMAL

## 2025-03-16 NOTE — PROGRESS NOTES
Kimberly Dias (:  3/28/1934) is a 89 y.o. female,Established patient, here for evaluation of the following chief complaint(s):  Follow-Up from Hospital (Feel much better, started Gabapentin still having neck pain )       Assessment & Plan  DDD (degenerative disc disease), cervical    Not progressing;  Referral to Lucy Polo for further recommendations.    Orders:    Lucy Sun PA-C, Orthopedic Surgery (Spine), [place]    Acute nonintractable headache, unspecified headache type    Improved;  Discussed likely related to above.         History of UTI    Not progressing;  UA: negative.  Will send for a culture.  Increase fluid intake.    Orders:    POCT Urinalysis no Micro    Culture, Urine      No follow-ups on file.       Subjective   HPI  Admitted 3/13, discharged 3/14  Headache- left side and it radiates down the left side of the neck, visual changes; lightening shocks- intermittent  MRI: microvascular ischemic changes  CT: severe degenerative changes at multiple levels (cervical spine); pulmonary nodule (right lung apex)  MRI cervical: has progressed; lidoderm patch placed  ESR, CRP: negative  On gabapentin  Urine culture: positive - finished cephalexin  Urinary urgency, no frequency or dysuria    Review of Systems       Objective   Physical Exam  Vitals reviewed.   HENT:      Head: Normocephalic.      Right Ear: External ear normal.      Left Ear: External ear normal.      Nose: Nose normal.   Cardiovascular:      Rate and Rhythm: Normal rate and regular rhythm.      Heart sounds: Normal heart sounds, S1 normal and S2 normal.   Pulmonary:      Effort: Pulmonary effort is normal.      Breath sounds: Normal breath sounds and air entry.   Musculoskeletal:      Cervical back: Tenderness present. Decreased range of motion.   Neurological:      Mental Status: She is alert.   Psychiatric:         Mood and Affect: Mood normal.       An electronic signature was used to authenticate

## 2025-03-17 ENCOUNTER — OFFICE VISIT (OUTPATIENT)
Dept: FAMILY MEDICINE CLINIC | Age: 89
End: 2025-03-17
Payer: MEDICARE

## 2025-03-17 VITALS
SYSTOLIC BLOOD PRESSURE: 142 MMHG | BODY MASS INDEX: 23.55 KG/M2 | OXYGEN SATURATION: 95 % | WEIGHT: 128 LBS | HEART RATE: 62 BPM | DIASTOLIC BLOOD PRESSURE: 62 MMHG | HEIGHT: 62 IN

## 2025-03-17 DIAGNOSIS — R51.9 ACUTE NONINTRACTABLE HEADACHE, UNSPECIFIED HEADACHE TYPE: ICD-10-CM

## 2025-03-17 DIAGNOSIS — M50.30 DDD (DEGENERATIVE DISC DISEASE), CERVICAL: Primary | ICD-10-CM

## 2025-03-17 DIAGNOSIS — Z87.440 HISTORY OF UTI: ICD-10-CM

## 2025-03-17 LAB
BILIRUBIN, POC: NORMAL
BLOOD URINE, POC: NORMAL
CLARITY, POC: CLEAR
COLOR, POC: YELLOW
GLUCOSE URINE, POC: NORMAL MG/DL
KETONES, POC: NORMAL MG/DL
LEUKOCYTE EST, POC: NORMAL
NITRITE, POC: NORMAL
PH, POC: 7.5
PROTEIN, POC: NORMAL MG/DL
SPECIFIC GRAVITY, POC: 1.02
UROBILINOGEN, POC: 0.2 MG/DL

## 2025-03-17 PROCEDURE — 99213 OFFICE O/P EST LOW 20 MIN: CPT | Performed by: NURSE PRACTITIONER

## 2025-03-17 PROCEDURE — 81002 URINALYSIS NONAUTO W/O SCOPE: CPT | Performed by: NURSE PRACTITIONER

## 2025-03-17 PROCEDURE — 1123F ACP DISCUSS/DSCN MKR DOCD: CPT | Performed by: NURSE PRACTITIONER

## 2025-03-19 ENCOUNTER — TELEPHONE (OUTPATIENT)
Dept: ORTHOPEDIC SURGERY | Age: 89
End: 2025-03-19

## 2025-03-19 LAB
BACTERIA UR CULT: ABNORMAL
ORGANISM: ABNORMAL

## 2025-03-19 NOTE — TELEPHONE ENCOUNTER
Appointment Request     Patient requesting earlier appointment: Yes  Appointment offered to patient: 04/15/2025  Patient Contact Number: [phone]    PATIENT HAS HAD MRI AND IS REQUESTING TO BE SEEN SOONER AND OR REFERRED FOR JF INJECTION.

## 2025-03-20 ENCOUNTER — RESULTS FOLLOW-UP (OUTPATIENT)
Dept: FAMILY MEDICINE CLINIC | Age: 89
End: 2025-03-20

## 2025-03-20 RX ORDER — CIPROFLOXACIN 250 MG/1
250 TABLET, FILM COATED ORAL 2 TIMES DAILY
Qty: 6 TABLET | Refills: 0 | Status: SHIPPED | OUTPATIENT
Start: 2025-03-20 | End: 2025-03-23

## 2025-03-21 ENCOUNTER — TELEPHONE (OUTPATIENT)
Dept: FAMILY MEDICINE CLINIC | Age: 89
End: 2025-03-21

## 2025-03-21 NOTE — TELEPHONE ENCOUNTER
Patient called and stated that she used a 5% lidocaine patch for her neck pain last night and it helped with the neck pain she's been experiencing, she stated she was able to sleep though the night because of it. She would like Rhea to send in a prescription to [facility] pharmacy if possible.

## 2025-03-22 ENCOUNTER — HOSPITAL ENCOUNTER (EMERGENCY)
Age: 89
Discharge: HOME | End: 2025-03-22
Attending: EMERGENCY MEDICINE
Payer: MEDICARE

## 2025-03-22 VITALS
OXYGEN SATURATION: 96 % | WEIGHT: 128.8 LBS | SYSTOLIC BLOOD PRESSURE: 137 MMHG | HEART RATE: 72 BPM | RESPIRATION RATE: 18 BRPM | DIASTOLIC BLOOD PRESSURE: 67 MMHG | TEMPERATURE: 97.8 F | HEIGHT: 62 IN | BODY MASS INDEX: 23.7 KG/M2

## 2025-03-22 DIAGNOSIS — R51.9 ACUTE NONINTRACTABLE HEADACHE, UNSPECIFIED HEADACHE TYPE: Primary | ICD-10-CM

## 2025-03-22 PROCEDURE — 99284 EMERGENCY DEPT VISIT MOD MDM: CPT

## 2025-03-22 PROCEDURE — 96375 TX/PRO/DX INJ NEW DRUG ADDON: CPT

## 2025-03-22 PROCEDURE — 6360000002 HC RX W HCPCS: Performed by: EMERGENCY MEDICINE

## 2025-03-22 PROCEDURE — 96374 THER/PROPH/DIAG INJ IV PUSH: CPT

## 2025-03-22 PROCEDURE — 6370000000 HC RX 637 (ALT 250 FOR IP): Performed by: EMERGENCY MEDICINE

## 2025-03-22 RX ORDER — ACETAMINOPHEN 500 MG
1000 TABLET ORAL
Status: COMPLETED | OUTPATIENT
Start: 2025-03-22 | End: 2025-03-22

## 2025-03-22 RX ORDER — KETOROLAC TROMETHAMINE 15 MG/ML
15 INJECTION, SOLUTION INTRAMUSCULAR; INTRAVENOUS ONCE
Status: COMPLETED | OUTPATIENT
Start: 2025-03-22 | End: 2025-03-22

## 2025-03-22 RX ORDER — PROCHLORPERAZINE MALEATE 5 MG/1
5 TABLET ORAL EVERY 6 HOURS PRN
Qty: 12 TABLET | Refills: 0 | Status: SHIPPED | OUTPATIENT
Start: 2025-03-22 | End: 2025-03-27 | Stop reason: SDUPTHER

## 2025-03-22 RX ORDER — LIDOCAINE 4 G/G
1 PATCH TOPICAL ONCE
Status: DISCONTINUED | OUTPATIENT
Start: 2025-03-22 | End: 2025-03-22 | Stop reason: HOSPADM

## 2025-03-22 RX ORDER — PROCHLORPERAZINE EDISYLATE 5 MG/ML
5 INJECTION INTRAMUSCULAR; INTRAVENOUS ONCE
Status: COMPLETED | OUTPATIENT
Start: 2025-03-22 | End: 2025-03-22

## 2025-03-22 RX ADMIN — ACETAMINOPHEN 1000 MG: 500 TABLET ORAL at 15:35

## 2025-03-22 RX ADMIN — KETOROLAC TROMETHAMINE 15 MG: 15 INJECTION, SOLUTION INTRAMUSCULAR; INTRAVENOUS at 15:38

## 2025-03-22 RX ADMIN — PROCHLORPERAZINE EDISYLATE 5 MG: 5 INJECTION INTRAMUSCULAR; INTRAVENOUS at 15:36

## 2025-03-22 ASSESSMENT — PAIN DESCRIPTION - LOCATION: LOCATION: HEAD

## 2025-03-22 ASSESSMENT — PAIN - FUNCTIONAL ASSESSMENT: PAIN_FUNCTIONAL_ASSESSMENT: 0-10

## 2025-03-22 ASSESSMENT — PAIN DESCRIPTION - DESCRIPTORS: DESCRIPTORS: SHOOTING

## 2025-03-22 ASSESSMENT — PAIN DESCRIPTION - ORIENTATION: ORIENTATION: LEFT

## 2025-03-22 ASSESSMENT — PAIN SCALES - GENERAL
PAINLEVEL_OUTOF10: 7
PAINLEVEL_OUTOF10: 3

## 2025-03-22 NOTE — DISCHARGE INSTRUCTIONS
Take Tylenol as needed for headache.  You may also take Compazine as needed for headache or nausea.

## 2025-03-22 NOTE — ED PROVIDER NOTES
[facility] EMERGENCY DEPARTMENT    CHIEF COMPLAINT  Headache (L)       HISTORY OF PRESENT ILLNESS  Kimberly Dias is a 89 y.o. female who presents to the ED presenting with head and neck pain.  Reports the pain on the left side of her neck and the left side of her head that feels like a sharp electrical shooting pain.  It started around 2 PM yesterday and has been worsening since then.  It is intermittent and feels tender to the touch.  Denies any injury.  She was admitted for the same symptoms last week and diagnosed with degenerative disc disease.  She tried a lidocaine patch last night but it did not give her adequate relief.  She has not taken anything else for pain or her symptoms.  Denies vision loss, focal weakness, sensory loss, slurred speech, speech changes.  Denies any recent head trauma or injury.    I have reviewed the following from the nursing documentation:    Past Medical History:   Diagnosis Date    Bradycardia     Cerumen impaction     Depression     Glaucoma     Hypertension     Osteoarthritis      Past Surgical History:   Procedure Laterality Date    APPENDECTOMY      BREAST LUMPECTOMY      HYSTERECTOMY (CERVIX STATUS UNKNOWN)  2004    KNEE SURGERY Left      Family History   Problem Relation Age of Onset    Stroke Father     High Blood Pressure Father     Stroke Brother     High Blood Pressure Brother     Alzheimer's Disease Mother     High Blood Pressure Mother     Diabetes Mother      Social History     Socioeconomic History    Marital status:      Spouse name: Not on file    Number of children: Not on file    Years of education: Not on file    Highest education level: Not on file   Occupational History    Not on file   Tobacco Use    Smoking status: Never    Smokeless tobacco: Never   Substance and Sexual Activity    Alcohol use: No     Alcohol/week: 0.0 standard drinks of alcohol    Drug use: Not on file    Sexual activity: Not Currently     Partners: Male   Other Topics  C8, moderate left C8, severe left T1, and moderate right T1 foraminal stenosis.  3.  Patchy marrow edema within the left C1-C2 articular facet with thickening of the tectorial membrane contributing to mild spinal stenosis.           - Consults:   None         I, Citlali Michael MD, am the primary clinician of record.     During the patient's ED course, the patient was given:  Medications   acetaminophen (TYLENOL) tablet 1,000 mg (1,000 mg Oral Given 3/22/25 1535)   ketorolac (TORADOL) injection 15 mg (15 mg IntraVENous Given 3/22/25 1538)   prochlorperazine (COMPAZINE) injection 5 mg (5 mg IntraVENous Given 3/22/25 1536)        Patient was given prescriptions for the following medications. I counseled the patient as to how to take these medications.  Discharge Medication List as of 3/22/2025  5:49 PM        START taking these medications    Details   prochlorperazine (COMPAZINE) 5 MG tablet Take 1 tablet by mouth every 6 hours as needed (for nausea or migraine), Disp-12 tablet, R-0Normal             Patient instructed to follow up with:   Rhea Oneal, GEOVANI - [facility]  [address] [address]  [phone]            All questions were answered and the patient/family expressed understanding and agreement with the plan.     PROCEDURES  None    CRITICAL CARE  N/A    CLINICAL IMPRESSION  1. Acute nonintractable headache, unspecified headache type        DISPOSITION  Decision To Discharge 03/22/2025 05:16:14 PM     Citlali Michael MD    Note: This chart was created using voice recognition dictation software. Efforts were made by me to ensure accuracy, however some errors may be present due to limitations of this technology and occasionally words are not transcribed correctly.        Citlali Michael MD  03/23/25 0666

## 2025-03-22 NOTE — ED TRIAGE NOTES
Pt arrives to ED c/o headache for approx 1 week. Pt was seen last week for same complaint. Pt denies any new changes since previous visit. Pt denies blood thinner.

## 2025-03-23 DIAGNOSIS — M50.30 DDD (DEGENERATIVE DISC DISEASE), CERVICAL: Primary | ICD-10-CM

## 2025-03-23 RX ORDER — LIDOCAINE 50 MG/G
1 PATCH TOPICAL DAILY
Qty: 10 PATCH | Refills: 0 | Status: SHIPPED | OUTPATIENT
Start: 2025-03-23 | End: 2025-04-02

## 2025-03-24 ENCOUNTER — TELEPHONE (OUTPATIENT)
Dept: FAMILY MEDICINE CLINIC | Age: 89
End: 2025-03-24

## 2025-03-24 ENCOUNTER — TELEPHONE (OUTPATIENT)
Dept: CASE MANAGEMENT | Age: 89
End: 2025-03-24

## 2025-03-24 RX ORDER — GABAPENTIN 100 MG/1
200 CAPSULE ORAL 2 TIMES DAILY PRN
Qty: 60 CAPSULE | Refills: 0 | Status: CANCELLED | OUTPATIENT
Start: 2025-03-24 | End: 2025-04-23

## 2025-03-24 RX ORDER — PROCHLORPERAZINE MALEATE 5 MG/1
5 TABLET ORAL EVERY 8 HOURS PRN
Qty: 30 TABLET | Refills: 0 | Status: CANCELLED | OUTPATIENT
Start: 2025-03-24

## 2025-03-24 NOTE — TELEPHONE ENCOUNTER
Medication:   Requested Prescriptions     Pending Prescriptions Disp Refills    gabapentin (NEURONTIN) 100 MG capsule 60 capsule 0     Sig: Take 2 capsules by mouth 2 times daily as needed (neck pain) for up to 30 days. Intended supply: 90 days    prochlorperazine (COMPAZINE) 5 MG tablet 30 tablet 0     Sig: Take 1 tablet by mouth every 8 hours as needed (for nausea or migraine)        Last Filled:      Patient Phone Number: [phone] (home)     Last appt: 3/17/2025   Next appt: Visit date not found    Last OARRS:        No data to display                Pt wants to stay with bid. She requested  prochlorperzine because taking 1 of those when needed does help with the pain

## 2025-03-24 NOTE — TELEPHONE ENCOUNTER
Rhea WELLS,    Incidental pulmonary nodule notification spine cervical CT 3/13/25.  6mm R lung apex.  Recommend f/u ct in 6-12 months if clinically appropriate.     Thanks,  Rubi HERNANDEZN, RN   Lung Nodule Navigator  [facility]  [phone]

## 2025-03-24 NOTE — TELEPHONE ENCOUNTER
Patient would like to know if her prescription can be upped to 3 twice a day instead of two twice a day. Please call patient to advise.

## 2025-03-25 ENCOUNTER — TELEPHONE (OUTPATIENT)
Dept: ADMINISTRATIVE | Age: 89
End: 2025-03-25

## 2025-03-25 NOTE — TELEPHONE ENCOUNTER
Submitted PA for Lidocaine 5% patches  [facility] [id number] STATUS: PENDING.    Follow up done daily; if no decision with in three days we will refax.  If another three days goes by with no decision will call the insurance for status.

## 2025-03-26 NOTE — TELEPHONE ENCOUNTER
The medication is APPROVED.    Outcome  Approved on March 25 by [employer] [facility] Medicare 2017  CaseId:[id number];Status:Approved;Review Type:Prior Auth;Coverage Start Date:02/23/2025;Coverage End Date:03/25/2026;  Effective Date: 2/23/2025  Authorization Expiration Date: 3/25/2026    If this requires a response please respond to the pool ( P [facility] MEDICATION PRE-AUTH).      Thank you please advise patient.

## 2025-03-26 NOTE — TELEPHONE ENCOUNTER
Patients daughter called to follow up on the refill requests. She stated that her mom will be out of these meds after today, the hospital only gave monty enough for a few days.Please call patients daughter to advise.

## 2025-03-27 RX ORDER — GABAPENTIN 100 MG/1
200 CAPSULE ORAL 2 TIMES DAILY PRN
Qty: 60 CAPSULE | Refills: 0 | Status: SHIPPED | OUTPATIENT
Start: 2025-03-27 | End: 2025-04-26

## 2025-03-27 RX ORDER — PROCHLORPERAZINE MALEATE 5 MG/1
5 TABLET ORAL EVERY 6 HOURS PRN
Qty: 12 TABLET | Refills: 0 | Status: SHIPPED | OUTPATIENT
Start: 2025-03-27 | End: 2025-03-27

## 2025-03-27 RX ORDER — GABAPENTIN 100 MG/1
200 CAPSULE ORAL 2 TIMES DAILY PRN
Qty: 60 CAPSULE | Refills: 0 | Status: SHIPPED | OUTPATIENT
Start: 2025-03-27 | End: 2025-03-27

## 2025-03-27 RX ORDER — PROCHLORPERAZINE MALEATE 5 MG/1
5 TABLET ORAL EVERY 6 HOURS PRN
Qty: 12 TABLET | Refills: 0 | Status: SHIPPED | OUTPATIENT
Start: 2025-03-27

## 2025-04-07 DIAGNOSIS — R91.1 LUNG NODULE: Primary | ICD-10-CM

## 2025-04-15 ENCOUNTER — OFFICE VISIT (OUTPATIENT)
Dept: ORTHOPEDIC SURGERY | Age: 89
End: 2025-04-15
Payer: MEDICARE

## 2025-04-15 VITALS — WEIGHT: 128 LBS | BODY MASS INDEX: 23.55 KG/M2 | HEIGHT: 62 IN

## 2025-04-15 DIAGNOSIS — M47.812 CERVICAL SPONDYLOSIS: ICD-10-CM

## 2025-04-15 DIAGNOSIS — M50.30 DDD (DEGENERATIVE DISC DISEASE), CERVICAL: ICD-10-CM

## 2025-04-15 DIAGNOSIS — M48.02 CERVICAL STENOSIS OF SPINAL CANAL: Primary | ICD-10-CM

## 2025-04-15 PROCEDURE — 1123F ACP DISCUSS/DSCN MKR DOCD: CPT | Performed by: PHYSICIAN ASSISTANT

## 2025-04-15 PROCEDURE — 99203 OFFICE O/P NEW LOW 30 MIN: CPT | Performed by: PHYSICIAN ASSISTANT

## 2025-04-15 PROCEDURE — 1159F MED LIST DOCD IN RCRD: CPT | Performed by: PHYSICIAN ASSISTANT

## 2025-04-15 NOTE — PROGRESS NOTES
Allergies:  Codeine and Sulfa antibiotics  Social History:    reports that she has never smoked. She has never used smokeless tobacco. She reports that she does not drink alcohol.  Family History:   Family History   Problem Relation Age of Onset    Stroke Father     High Blood Pressure Father     Stroke Brother     High Blood Pressure Brother     Alzheimer's Disease Mother     High Blood Pressure Mother     Diabetes Mother        REVIEW OF SYSTEMS: Full ROS reviewed & scanned into chart  CONSTITUTIONAL: Denies unexplained weight loss, fevers   SKIN: Denies active skin conditions   ENT: ADMITS dizziness, nosebleeds  RESPIRATORY: Denies difficulty breathing; h/o  CARDIOVASCULAR: Denies new chest pain   NEUROLOGICAL: Denies progressive weakness  PSYCHOLOGICAL: Denies anxiety, depression   HEMATOLOGIC: Denies blood disorders, cancer  ENDOCRINE: Denies excessive thirst, heat/cold  GI: Denies current nausea, vomiting, diarrhea   : Denies new bowel or bladder incontinence; admits h/o UI       PHYSICAL EXAM:    Vitals: Height 1.575 m (5' 2\"), weight 58.1 kg (128 lb).  Pain score    GENERAL EXAM:  General Apparence: Patient is adequately groomed with no evidence of malnutrition.  Orientation: The patient is oriented to time, place and person.   Mood & Affect:The patient's mood and affect are appropriate   Lymphatic: The lymphatic examination bilaterally reveals all areas to be without enlargement or induration  Sensation: Sensation is intact without deficit  Coordination/Balance: Good coordination     CERVICAL EXAMINATION:  Inspection: Local inspection shows no step-off or bruising.  Cervical alignment is normal.     Palpation: No evidence of tenderness at the midline, + TTP slightly left of midline upper cervical spine   Range of Motion: Mild loss of flexion moderate to severely limited extension and lateral rotation  Strength: 5/5 bilateral upper extremities   Special Tests:      Spurling's, L'Hermitte's & Desai's

## 2025-04-23 ENCOUNTER — TELEPHONE (OUTPATIENT)
Dept: ORTHOPEDIC SURGERY | Age: 89
End: 2025-04-23

## 2025-04-23 DIAGNOSIS — M47.812 CERVICAL SPONDYLOSIS: ICD-10-CM

## 2025-04-23 DIAGNOSIS — M50.30 DDD (DEGENERATIVE DISC DISEASE), CERVICAL: ICD-10-CM

## 2025-04-23 DIAGNOSIS — M48.02 CERVICAL STENOSIS OF SPINAL CANAL: Primary | ICD-10-CM

## 2025-04-23 NOTE — TELEPHONE ENCOUNTER
General Question     Subject: REFERRAL TO NEURO SURGEON  Patient and /or Facility Request: Kimberly Dias \"Kimberly Kern Arun\"   Contact Number: [phone]       PATIENT IS CALLING IN THE SURGEON SHE WAS REF TO IS NOT IN HER NETWORK. ASKING FOR A NEW ONE. ALSO ASKING IF SHE SHOULD JUST GO TO HER PAIN MANAGEMENT DR. SERENE VALDES RETURN CALL

## 2025-04-23 NOTE — TELEPHONE ENCOUNTER
S/W the daughter of the patient whom is on HIPAA regarding the message I received from the patient about her referrals to Dr. Albarran and [facility]. The daughter was insure and she decided to reach out to the patient to have the patient call us back to discuss. We both voiced understanding.

## 2025-04-23 NOTE — TELEPHONE ENCOUNTER
L/M for the patient informing her I was calling regarding her referral to neurosurgeon. I informed her that since [facility] does not take her insurance we can put in a referral to [facility] Neuro[facility]. I informed her I will place the order and fax everything to their office. She was informed they will call her to schedule her for an appointment once they receive the referral.     She was also instructed to proceed with an appointment to Dr. Albarran for formal pain management. She was provided the phone number to call & schedule the appointment. She may call back with any questions or concerns.

## 2025-04-30 RX ORDER — GABAPENTIN 100 MG/1
200 CAPSULE ORAL 2 TIMES DAILY PRN
Qty: 60 CAPSULE | Refills: 0 | Status: SHIPPED | OUTPATIENT
Start: 2025-04-30 | End: 2025-05-30

## 2025-04-30 NOTE — TELEPHONE ENCOUNTER
Medication:   Requested Prescriptions     Pending Prescriptions Disp Refills    gabapentin (NEURONTIN) 100 MG capsule 60 capsule 0     Sig: Take 2 capsules by mouth 2 times daily as needed (neck pain) for up to 30 days. Intended supply: 90 days       Last Filled:  3/27/25    Patient Phone Number: [phone] (home)     Last appt: 3/17/2025   Next appt: Visit date not found    Last Labs DM:   Lab Results   Component Value Date/Time    LABA1C 5.5 03/14/2025 04:41 AM     Last Lipid:   Lab Results   Component Value Date/Time    CHOL 139 03/14/2025 04:41 AM    TRIG 47 03/14/2025 04:41 AM    HDL 41 03/14/2025 04:41 AM     Last PSA: No results found for: \"PSA\"  Last Thyroid:   Lab Results   Component Value Date/Time    TSH 2.36 06/18/2024 10:23 AM

## 2025-05-26 RX ORDER — GABAPENTIN 100 MG/1
CAPSULE ORAL
Qty: 60 CAPSULE | Refills: 0 | Status: SHIPPED | OUTPATIENT
Start: 2025-06-01 | End: 2025-07-01

## 2025-06-23 RX ORDER — GABAPENTIN 100 MG/1
CAPSULE ORAL
Qty: 60 CAPSULE | Refills: 0 | Status: SHIPPED | OUTPATIENT
Start: 2025-06-23 | End: 2025-06-27

## 2025-06-23 NOTE — TELEPHONE ENCOUNTER
Medication:   Requested Prescriptions     Pending Prescriptions Disp Refills    gabapentin (NEURONTIN) 100 MG capsule [Pharmacy Med Name: Gabapentin 100 MG Oral Capsule] 60 capsule 0     Sig: TAKE 2 CAPSULES BY MOUTH TWICE DAILY AS NEEDED FOR  NECK  PAIN  FOR  UP  TO  30  DAYS       Last Filled:  6/1/25    Patient Phone Number: [phone] (home)     Last appt: 3/17/2025   Next appt: 6/27/2025    Last Labs DM:   Lab Results   Component Value Date/Time    LABA1C 5.5 03/14/2025 04:41 AM     Last Lipid:   Lab Results   Component Value Date/Time    CHOL 139 03/14/2025 04:41 AM    TRIG 47 03/14/2025 04:41 AM    HDL 41 03/14/2025 04:41 AM     Last PSA: No results found for: \"PSA\"  Last Thyroid:   Lab Results   Component Value Date/Time    TSH 2.36 06/18/2024 10:23 AM

## 2025-06-27 ENCOUNTER — OFFICE VISIT (OUTPATIENT)
Dept: FAMILY MEDICINE CLINIC | Age: 89
End: 2025-06-27
Payer: MEDICARE

## 2025-06-27 VITALS
DIASTOLIC BLOOD PRESSURE: 72 MMHG | HEIGHT: 62 IN | BODY MASS INDEX: 23.37 KG/M2 | SYSTOLIC BLOOD PRESSURE: 134 MMHG | OXYGEN SATURATION: 97 % | HEART RATE: 53 BPM | WEIGHT: 127 LBS

## 2025-06-27 DIAGNOSIS — R32 URINARY INCONTINENCE, UNSPECIFIED TYPE: ICD-10-CM

## 2025-06-27 DIAGNOSIS — F32.A DEPRESSION, UNSPECIFIED DEPRESSION TYPE: ICD-10-CM

## 2025-06-27 DIAGNOSIS — Z00.00 ENCOUNTER FOR ANNUAL WELLNESS EXAM IN MEDICARE PATIENT: Primary | ICD-10-CM

## 2025-06-27 DIAGNOSIS — M47.812 CERVICAL SPONDYLOSIS: ICD-10-CM

## 2025-06-27 DIAGNOSIS — I10 ESSENTIAL HYPERTENSION: ICD-10-CM

## 2025-06-27 DIAGNOSIS — M54.81 CERVICO-OCCIPITAL NEURALGIA: ICD-10-CM

## 2025-06-27 PROCEDURE — G0439 PPPS, SUBSEQ VISIT: HCPCS | Performed by: NURSE PRACTITIONER

## 2025-06-27 PROCEDURE — 1159F MED LIST DOCD IN RCRD: CPT | Performed by: NURSE PRACTITIONER

## 2025-06-27 PROCEDURE — 1123F ACP DISCUSS/DSCN MKR DOCD: CPT | Performed by: NURSE PRACTITIONER

## 2025-06-27 RX ORDER — GABAPENTIN 300 MG/1
300 CAPSULE ORAL 3 TIMES DAILY
COMMUNITY
Start: 2025-06-23

## 2025-06-27 ASSESSMENT — PATIENT HEALTH QUESTIONNAIRE - PHQ9
3. TROUBLE FALLING OR STAYING ASLEEP: NOT AT ALL
SUM OF ALL RESPONSES TO PHQ QUESTIONS 1-9: 0
5. POOR APPETITE OR OVEREATING: NOT AT ALL
4. FEELING TIRED OR HAVING LITTLE ENERGY: NOT AT ALL
SUM OF ALL RESPONSES TO PHQ QUESTIONS 1-9: 0
6. FEELING BAD ABOUT YOURSELF - OR THAT YOU ARE A FAILURE OR HAVE LET YOURSELF OR YOUR FAMILY DOWN: NOT AT ALL
SUM OF ALL RESPONSES TO PHQ QUESTIONS 1-9: 0
1. LITTLE INTEREST OR PLEASURE IN DOING THINGS: NOT AT ALL
SUM OF ALL RESPONSES TO PHQ QUESTIONS 1-9: 0
7. TROUBLE CONCENTRATING ON THINGS, SUCH AS READING THE NEWSPAPER OR WATCHING TELEVISION: NOT AT ALL
2. FEELING DOWN, DEPRESSED OR HOPELESS: NOT AT ALL
10. IF YOU CHECKED OFF ANY PROBLEMS, HOW DIFFICULT HAVE THESE PROBLEMS MADE IT FOR YOU TO DO YOUR WORK, TAKE CARE OF THINGS AT HOME, OR GET ALONG WITH OTHER PEOPLE: NOT DIFFICULT AT ALL
8. MOVING OR SPEAKING SO SLOWLY THAT OTHER PEOPLE COULD HAVE NOTICED. OR THE OPPOSITE, BEING SO FIGETY OR RESTLESS THAT YOU HAVE BEEN MOVING AROUND A LOT MORE THAN USUAL: NOT AT ALL
9. THOUGHTS THAT YOU WOULD BE BETTER OFF DEAD, OR OF HURTING YOURSELF: NOT AT ALL

## 2025-06-27 NOTE — PROGRESS NOTES
Medicare Annual Wellness Visit  Name: Kimberly Dias Today’s Date: 2025   MRN: [id number] Sex: Female   Age: 89 y.o. Ethnicity: Non- / Non    : 3/28/1934 Race: White (non-)      Kimberly Dias is here for Medicare AWV    Screenings for behavioral, psychosocial and functional/safety risks, and cognitive dysfunction are all negative except as indicated below. These results, as well as other patient data from the Health Risk Assessment form, are documented in Flowsheets linked to this Encounter.    Neck pain  Continues with decreased ROM  Numbness persists  Hx of PT- no improvement  Medication helps  Gabapentin 300 mg TID  Diclofenac creams  Occipital nerve block  Sees Dr. Albarran    HYPERTENSION  BP good at visit  On hctz and amlodipine    Mood is good  All excited- had 5 kids graduate from high school- 1 will be a , getting  next months  On celexa- helps    Urinary incontinence  Has urgency  Not really any frequency  Declines UA      Allergies   Allergen Reactions    Codeine Nausea And Vomiting    Sulfa Antibiotics Nausea And Vomiting       Prior to Visit Medications    Medication Sig Taking? Authorizing Provider   gabapentin (NEURONTIN) 300 MG capsule Take 1 capsule by mouth 3 times daily. Yes Celeste Bean MD   amLODIPine (NORVASC) 10 MG tablet TAKE 1 TABLET DAILY Yes Rhea Oneal APRN - CNP   lisinopril (PRINIVIL;ZESTRIL) 20 MG tablet TAKE 1 TABLET EVERY MORNING AND 1 TABLET EVERY NIGHT AT BEDTIME Yes Cole Machado MD   hydroCHLOROthiazide 12.5 MG capsule TAKE 1 CAPSULE EVERY MORNING Yes Cole Machado MD   citalopram (CELEXA) 20 MG tablet TAKE 1 TABLET DAILY Yes Cole Machado MD   Docusate Calcium (STOOL SOFTENER PO) Take by mouth Yes Celeste Bean MD   vitamin D (CHOLECALCIFEROL) 1000 units TABS tablet Take 1 tablet by mouth daily Yes Celeste Bean MD   DORZOLAMIDE HCL-TIMOLOL MAL OP Apply to eye 1 drop every 12 hours Yes

## 2025-08-01 DIAGNOSIS — F32.A DEPRESSION, UNSPECIFIED DEPRESSION TYPE: ICD-10-CM

## 2025-08-01 DIAGNOSIS — I10 ESSENTIAL HYPERTENSION: ICD-10-CM

## 2025-08-01 RX ORDER — CITALOPRAM HYDROBROMIDE 20 MG/1
20 TABLET ORAL DAILY
Qty: 90 TABLET | Refills: 3 | Status: SHIPPED | OUTPATIENT
Start: 2025-08-01

## 2025-08-01 RX ORDER — HYDROCHLOROTHIAZIDE 12.5 MG/1
12.5 CAPSULE ORAL EVERY MORNING
Qty: 90 CAPSULE | Refills: 3 | Status: SHIPPED | OUTPATIENT
Start: 2025-08-01

## 2025-08-01 RX ORDER — LISINOPRIL 20 MG/1
TABLET ORAL
Qty: 180 TABLET | Refills: 3 | Status: SHIPPED | OUTPATIENT
Start: 2025-08-01

## 2025-08-01 NOTE — TELEPHONE ENCOUNTER
Medication:   Requested Prescriptions     Pending Prescriptions Disp Refills    citalopram (CELEXA) 20 MG tablet [Pharmacy Med Name: CITALOPRAM HYDROBROMIDE TABS 20MG] 90 tablet 3     Sig: TAKE 1 TABLET DAILY    hydroCHLOROthiazide 12.5 MG capsule [Pharmacy Med Name: HYDROCHLOROTHIAZIDE CAPS 12.5MG] 90 capsule 3     Sig: TAKE 1 CAPSULE EVERY MORNING    lisinopril (PRINIVIL;ZESTRIL) 20 MG tablet [Pharmacy Med Name: LISINOPRIL TABS 20MG] 180 tablet 3     Sig: TAKE 1 TABLET EVERY MORNING AND 1 TABLET EVERY NIGHT AT BEDTIME        Last Filled:      Patient Phone Number: [phone] (home)     Last appt: 6/27/2025   Next appt: Visit date not found    Last OARRS:        No data to display